# Patient Record
Sex: MALE | Race: OTHER | NOT HISPANIC OR LATINO | Employment: UNEMPLOYED | URBAN - METROPOLITAN AREA
[De-identification: names, ages, dates, MRNs, and addresses within clinical notes are randomized per-mention and may not be internally consistent; named-entity substitution may affect disease eponyms.]

---

## 2017-01-01 ENCOUNTER — APPOINTMENT (OUTPATIENT)
Dept: RADIOLOGY | Facility: MEDICAL CENTER | Age: 67
DRG: 435 | End: 2017-01-01
Attending: HOSPITALIST
Payer: MEDICARE

## 2017-01-01 ENCOUNTER — APPOINTMENT (OUTPATIENT)
Dept: RADIOLOGY | Facility: MEDICAL CENTER | Age: 67
DRG: 435 | End: 2017-01-01
Attending: EMERGENCY MEDICINE
Payer: MEDICARE

## 2017-01-01 ENCOUNTER — RESOLUTE PROFESSIONAL BILLING HOSPITAL PROF FEE (OUTPATIENT)
Dept: HOSPITALIST | Facility: MEDICAL CENTER | Age: 67
End: 2017-01-01
Payer: MEDICARE

## 2017-01-01 ENCOUNTER — HOSPITAL ENCOUNTER (INPATIENT)
Facility: MEDICAL CENTER | Age: 67
LOS: 4 days | DRG: 435 | End: 2017-03-06
Attending: EMERGENCY MEDICINE | Admitting: HOSPITALIST
Payer: MEDICARE

## 2017-01-01 ENCOUNTER — APPOINTMENT (OUTPATIENT)
Dept: RADIOLOGY | Facility: MEDICAL CENTER | Age: 67
DRG: 435 | End: 2017-01-01
Attending: INTERNAL MEDICINE
Payer: MEDICARE

## 2017-01-01 VITALS
SYSTOLIC BLOOD PRESSURE: 59 MMHG | TEMPERATURE: 97 F | HEART RATE: 113 BPM | DIASTOLIC BLOOD PRESSURE: 39 MMHG | BODY MASS INDEX: 23.84 KG/M2 | OXYGEN SATURATION: 93 % | HEIGHT: 76 IN | RESPIRATION RATE: 10 BRPM | WEIGHT: 195.77 LBS

## 2017-01-01 DIAGNOSIS — E86.0 DEHYDRATION: ICD-10-CM

## 2017-01-01 DIAGNOSIS — R11.10 CHRONIC VOMITING: ICD-10-CM

## 2017-01-01 DIAGNOSIS — R06.09 EXERTIONAL DYSPNEA: ICD-10-CM

## 2017-01-01 DIAGNOSIS — I26.99 OTHER PULMONARY EMBOLISM WITHOUT ACUTE COR PULMONALE, UNSPECIFIED CHRONICITY (HCC): ICD-10-CM

## 2017-01-01 DIAGNOSIS — R18.0 ASCITES, MALIGNANT: ICD-10-CM

## 2017-01-01 DIAGNOSIS — C43.9 MALIGNANT MELANOMA, UNSPECIFIED SITE (HCC): ICD-10-CM

## 2017-01-01 DIAGNOSIS — G89.29 CHRONIC RUQ PAIN: ICD-10-CM

## 2017-01-01 DIAGNOSIS — R10.11 CHRONIC RUQ PAIN: ICD-10-CM

## 2017-01-01 DIAGNOSIS — C79.9 METASTATIC CANCER (HCC): ICD-10-CM

## 2017-01-01 LAB
ABO GROUP BLD: NORMAL
ABO GROUP BLD: NORMAL
ALBUMIN SERPL BCP-MCNC: 2.4 G/DL (ref 3.2–4.9)
ALBUMIN/GLOB SERPL: 0.8 G/DL
ALP SERPL-CCNC: 548 U/L (ref 30–99)
ALT SERPL-CCNC: 48 U/L (ref 2–50)
ANION GAP SERPL CALC-SCNC: 10 MMOL/L (ref 0–11.9)
ANION GAP SERPL CALC-SCNC: 8 MMOL/L (ref 0–11.9)
ANISOCYTOSIS BLD QL SMEAR: ABNORMAL
APTT PPP: 32.9 SEC (ref 24.7–36)
AST SERPL-CCNC: 84 U/L (ref 12–45)
BASOPHILS # BLD AUTO: 0.5 % (ref 0–1.8)
BASOPHILS # BLD: 0.03 K/UL (ref 0–0.12)
BILIRUB SERPL-MCNC: 2.1 MG/DL (ref 0.1–1.5)
BLD GP AB SCN SERPL QL: NORMAL
BNP SERPL-MCNC: 185 PG/ML (ref 0–100)
BUN SERPL-MCNC: 32 MG/DL (ref 8–22)
BUN SERPL-MCNC: 35 MG/DL (ref 8–22)
CALCIUM SERPL-MCNC: 7.6 MG/DL (ref 8.5–10.5)
CALCIUM SERPL-MCNC: 8.1 MG/DL (ref 8.5–10.5)
CHLORIDE SERPL-SCNC: 104 MMOL/L (ref 96–112)
CHLORIDE SERPL-SCNC: 106 MMOL/L (ref 96–112)
CO2 SERPL-SCNC: 20 MMOL/L (ref 20–33)
CO2 SERPL-SCNC: 20 MMOL/L (ref 20–33)
COMMENT 1642: NORMAL
CREAT SERPL-MCNC: 0.83 MG/DL (ref 0.5–1.4)
CREAT SERPL-MCNC: 1.05 MG/DL (ref 0.5–1.4)
EKG IMPRESSION: NORMAL
EOSINOPHIL # BLD AUTO: 0.01 K/UL (ref 0–0.51)
EOSINOPHIL NFR BLD: 0.2 % (ref 0–6.9)
ERYTHROCYTE [DISTWIDTH] IN BLOOD BY AUTOMATED COUNT: 65 FL (ref 35.9–50)
ERYTHROCYTE [DISTWIDTH] IN BLOOD BY AUTOMATED COUNT: 66 FL (ref 35.9–50)
GFR SERPL CREATININE-BSD FRML MDRD: >60 ML/MIN/1.73 M 2
GFR SERPL CREATININE-BSD FRML MDRD: >60 ML/MIN/1.73 M 2
GLOBULIN SER CALC-MCNC: 3 G/DL (ref 1.9–3.5)
GLUCOSE SERPL-MCNC: 113 MG/DL (ref 65–99)
GLUCOSE SERPL-MCNC: 93 MG/DL (ref 65–99)
HCT VFR BLD AUTO: 38.5 % (ref 42–52)
HCT VFR BLD AUTO: 43.8 % (ref 42–52)
HGB BLD-MCNC: 12.3 G/DL (ref 14–18)
HGB BLD-MCNC: 14.1 G/DL (ref 14–18)
IMM GRANULOCYTES # BLD AUTO: 0.03 K/UL (ref 0–0.11)
IMM GRANULOCYTES NFR BLD AUTO: 0.5 % (ref 0–0.9)
INR PPP: 1.14 (ref 0.87–1.13)
LIPASE SERPL-CCNC: 32 U/L (ref 11–82)
LYMPHOCYTES # BLD AUTO: 0.48 K/UL (ref 1–4.8)
LYMPHOCYTES NFR BLD: 7.9 % (ref 22–41)
MACROCYTES BLD QL SMEAR: ABNORMAL
MCH RBC QN AUTO: 30.3 PG (ref 27–33)
MCH RBC QN AUTO: 30.8 PG (ref 27–33)
MCHC RBC AUTO-ENTMCNC: 31.9 G/DL (ref 33.7–35.3)
MCHC RBC AUTO-ENTMCNC: 32.2 G/DL (ref 33.7–35.3)
MCV RBC AUTO: 94.8 FL (ref 81.4–97.8)
MCV RBC AUTO: 95.6 FL (ref 81.4–97.8)
MONOCYTES # BLD AUTO: 0.26 K/UL (ref 0–0.85)
MONOCYTES NFR BLD AUTO: 4.3 % (ref 0–13.4)
MORPHOLOGY BLD-IMP: NORMAL
NEUTROPHILS # BLD AUTO: 5.28 K/UL (ref 1.82–7.42)
NEUTROPHILS NFR BLD: 86.6 % (ref 44–72)
NRBC # BLD AUTO: 0 K/UL
NRBC BLD AUTO-RTO: 0 /100 WBC
PLATELET # BLD AUTO: 127 K/UL (ref 164–446)
PLATELET # BLD AUTO: 142 K/UL (ref 164–446)
PLATELET BLD QL SMEAR: NORMAL
PMV BLD AUTO: 10.4 FL (ref 9–12.9)
PMV BLD AUTO: 10.8 FL (ref 9–12.9)
POLYCHROMASIA BLD QL SMEAR: NORMAL
POTASSIUM SERPL-SCNC: 4.5 MMOL/L (ref 3.6–5.5)
POTASSIUM SERPL-SCNC: 5 MMOL/L (ref 3.6–5.5)
PROT SERPL-MCNC: 5.4 G/DL (ref 6–8.2)
PROTHROMBIN TIME: 15 SEC (ref 12–14.6)
RBC # BLD AUTO: 4.06 M/UL (ref 4.7–6.1)
RBC # BLD AUTO: 4.58 M/UL (ref 4.7–6.1)
RBC BLD AUTO: PRESENT
RH BLD: NORMAL
SODIUM SERPL-SCNC: 134 MMOL/L (ref 135–145)
SODIUM SERPL-SCNC: 134 MMOL/L (ref 135–145)
TROPONIN I SERPL-MCNC: <0.01 NG/ML (ref 0–0.04)
WBC # BLD AUTO: 5.9 K/UL (ref 4.8–10.8)
WBC # BLD AUTO: 6.1 K/UL (ref 4.8–10.8)

## 2017-01-01 PROCEDURE — 83690 ASSAY OF LIPASE: CPT

## 2017-01-01 PROCEDURE — 700102 HCHG RX REV CODE 250 W/ 637 OVERRIDE(OP): Performed by: HOSPITALIST

## 2017-01-01 PROCEDURE — A9270 NON-COVERED ITEM OR SERVICE: HCPCS | Performed by: INTERNAL MEDICINE

## 2017-01-01 PROCEDURE — 700111 HCHG RX REV CODE 636 W/ 250 OVERRIDE (IP): Performed by: HOSPITALIST

## 2017-01-01 PROCEDURE — 770009 HCHG ROOM/CARE - ONCOLOGY SEMI PRI*

## 2017-01-01 PROCEDURE — 90471 IMMUNIZATION ADMIN: CPT

## 2017-01-01 PROCEDURE — 76942 ECHO GUIDE FOR BIOPSY: CPT

## 2017-01-01 PROCEDURE — 99233 SBSQ HOSP IP/OBS HIGH 50: CPT | Performed by: INTERNAL MEDICINE

## 2017-01-01 PROCEDURE — 700105 HCHG RX REV CODE 258: Performed by: HOSPITALIST

## 2017-01-01 PROCEDURE — 85610 PROTHROMBIN TIME: CPT

## 2017-01-01 PROCEDURE — A9270 NON-COVERED ITEM OR SERVICE: HCPCS | Performed by: HOSPITALIST

## 2017-01-01 PROCEDURE — 90670 PCV13 VACCINE IM: CPT | Performed by: HOSPITALIST

## 2017-01-01 PROCEDURE — 700117 HCHG RX CONTRAST REV CODE 255: Performed by: EMERGENCY MEDICINE

## 2017-01-01 PROCEDURE — 302129 PCA PLUS: Performed by: INTERNAL MEDICINE

## 2017-01-01 PROCEDURE — 700111 HCHG RX REV CODE 636 W/ 250 OVERRIDE (IP): Performed by: INTERNAL MEDICINE

## 2017-01-01 PROCEDURE — 80048 BASIC METABOLIC PNL TOTAL CA: CPT

## 2017-01-01 PROCEDURE — 84484 ASSAY OF TROPONIN QUANT: CPT

## 2017-01-01 PROCEDURE — 47000 NEEDLE BIOPSY OF LIVER PERQ: CPT

## 2017-01-01 PROCEDURE — 93005 ELECTROCARDIOGRAM TRACING: CPT | Performed by: EMERGENCY MEDICINE

## 2017-01-01 PROCEDURE — 96374 THER/PROPH/DIAG INJ IV PUSH: CPT

## 2017-01-01 PROCEDURE — 99285 EMERGENCY DEPT VISIT HI MDM: CPT

## 2017-01-01 PROCEDURE — 700111 HCHG RX REV CODE 636 W/ 250 OVERRIDE (IP): Performed by: NURSE PRACTITIONER

## 2017-01-01 PROCEDURE — 0W9G3ZZ DRAINAGE OF PERITONEAL CAVITY, PERCUTANEOUS APPROACH: ICD-10-PCS | Performed by: RADIOLOGY

## 2017-01-01 PROCEDURE — 700111 HCHG RX REV CODE 636 W/ 250 OVERRIDE (IP): Performed by: EMERGENCY MEDICINE

## 2017-01-01 PROCEDURE — 36415 COLL VENOUS BLD VENIPUNCTURE: CPT

## 2017-01-01 PROCEDURE — 96361 HYDRATE IV INFUSION ADD-ON: CPT

## 2017-01-01 PROCEDURE — 88307 TISSUE EXAM BY PATHOLOGIST: CPT

## 2017-01-01 PROCEDURE — 71260 CT THORAX DX C+: CPT

## 2017-01-01 PROCEDURE — 700102 HCHG RX REV CODE 250 W/ 637 OVERRIDE(OP): Performed by: INTERNAL MEDICINE

## 2017-01-01 PROCEDURE — 3E0234Z INTRODUCTION OF SERUM, TOXOID AND VACCINE INTO MUSCLE, PERCUTANEOUS APPROACH: ICD-10-PCS | Performed by: HOSPITALIST

## 2017-01-01 PROCEDURE — 302151 K-PAD 14X20: Performed by: INTERNAL MEDICINE

## 2017-01-01 PROCEDURE — 85730 THROMBOPLASTIN TIME PARTIAL: CPT

## 2017-01-01 PROCEDURE — 49083 ABD PARACENTESIS W/IMAGING: CPT

## 2017-01-01 PROCEDURE — 80053 COMPREHEN METABOLIC PANEL: CPT

## 2017-01-01 PROCEDURE — 85027 COMPLETE CBC AUTOMATED: CPT

## 2017-01-01 PROCEDURE — 700105 HCHG RX REV CODE 258: Performed by: EMERGENCY MEDICINE

## 2017-01-01 PROCEDURE — 83880 ASSAY OF NATRIURETIC PEPTIDE: CPT

## 2017-01-01 PROCEDURE — 0FB13ZX EXCISION OF RIGHT LOBE LIVER, PERCUTANEOUS APPROACH, DIAGNOSTIC: ICD-10-PCS | Performed by: RADIOLOGY

## 2017-01-01 PROCEDURE — 99223 1ST HOSP IP/OBS HIGH 75: CPT | Performed by: HOSPITALIST

## 2017-01-01 PROCEDURE — 700111 HCHG RX REV CODE 636 W/ 250 OVERRIDE (IP)

## 2017-01-01 PROCEDURE — 86901 BLOOD TYPING SEROLOGIC RH(D): CPT

## 2017-01-01 PROCEDURE — 85025 COMPLETE CBC W/AUTO DIFF WBC: CPT

## 2017-01-01 PROCEDURE — 86900 BLOOD TYPING SEROLOGIC ABO: CPT

## 2017-01-01 PROCEDURE — 94760 N-INVAS EAR/PLS OXIMETRY 1: CPT

## 2017-01-01 PROCEDURE — 99239 HOSP IP/OBS DSCHRG MGMT >30: CPT | Performed by: INTERNAL MEDICINE

## 2017-01-01 PROCEDURE — 99153 MOD SED SAME PHYS/QHP EA: CPT

## 2017-01-01 PROCEDURE — 302131 K PAD MOTOR: Performed by: INTERNAL MEDICINE

## 2017-01-01 PROCEDURE — 86850 RBC ANTIBODY SCREEN: CPT

## 2017-01-01 RX ORDER — SPIRONOLACTONE 25 MG/1
50 TABLET ORAL
Status: DISCONTINUED | OUTPATIENT
Start: 2017-01-01 | End: 2017-01-01 | Stop reason: HOSPADM

## 2017-01-01 RX ORDER — SCOLOPAMINE TRANSDERMAL SYSTEM 1 MG/1
1 PATCH, EXTENDED RELEASE TRANSDERMAL
Status: DISCONTINUED | OUTPATIENT
Start: 2017-01-01 | End: 2017-01-01 | Stop reason: HOSPADM

## 2017-01-01 RX ORDER — MORPHINE SULFATE 4 MG/ML
2-4 INJECTION, SOLUTION INTRAMUSCULAR; INTRAVENOUS
Status: DISCONTINUED | OUTPATIENT
Start: 2017-01-01 | End: 2017-01-01

## 2017-01-01 RX ORDER — FUROSEMIDE 10 MG/ML
40 INJECTION INTRAMUSCULAR; INTRAVENOUS ONCE
Status: COMPLETED | OUTPATIENT
Start: 2017-01-01 | End: 2017-01-01

## 2017-01-01 RX ORDER — ONDANSETRON 2 MG/ML
4 INJECTION INTRAMUSCULAR; INTRAVENOUS PRN
Status: ACTIVE | OUTPATIENT
Start: 2017-01-01 | End: 2017-01-01

## 2017-01-01 RX ORDER — BISACODYL 10 MG
10 SUPPOSITORY, RECTAL RECTAL
Status: DISCONTINUED | OUTPATIENT
Start: 2017-01-01 | End: 2017-01-01 | Stop reason: HOSPADM

## 2017-01-01 RX ORDER — LORAZEPAM 2 MG/ML
0.5 INJECTION INTRAMUSCULAR ONCE
Status: COMPLETED | OUTPATIENT
Start: 2017-01-01 | End: 2017-01-01

## 2017-01-01 RX ORDER — ACETAMINOPHEN 325 MG/1
650 TABLET ORAL EVERY 6 HOURS PRN
Status: DISCONTINUED | OUTPATIENT
Start: 2017-01-01 | End: 2017-01-01 | Stop reason: HOSPADM

## 2017-01-01 RX ORDER — HYDROMORPHONE HYDROCHLORIDE 2 MG/ML
2 INJECTION, SOLUTION INTRAMUSCULAR; INTRAVENOUS; SUBCUTANEOUS
Status: DISCONTINUED | OUTPATIENT
Start: 2017-01-01 | End: 2017-01-01 | Stop reason: ALTCHOICE

## 2017-01-01 RX ORDER — SODIUM CHLORIDE 9 MG/ML
INJECTION, SOLUTION INTRAVENOUS CONTINUOUS
Status: DISCONTINUED | OUTPATIENT
Start: 2017-01-01 | End: 2017-01-01 | Stop reason: HOSPADM

## 2017-01-01 RX ORDER — MIDAZOLAM HYDROCHLORIDE 1 MG/ML
.5-2 INJECTION INTRAMUSCULAR; INTRAVENOUS PRN
Status: ACTIVE | OUTPATIENT
Start: 2017-01-01 | End: 2017-01-01

## 2017-01-01 RX ORDER — PROMETHAZINE HYDROCHLORIDE 25 MG/1
12.5 SUPPOSITORY RECTAL EVERY 6 HOURS PRN
Status: DISCONTINUED | OUTPATIENT
Start: 2017-01-01 | End: 2017-01-01 | Stop reason: HOSPADM

## 2017-01-01 RX ORDER — SODIUM CHLORIDE 9 MG/ML
INJECTION, SOLUTION INTRAVENOUS CONTINUOUS
Status: DISCONTINUED | OUTPATIENT
Start: 2017-01-01 | End: 2017-01-01

## 2017-01-01 RX ORDER — OXYCODONE HYDROCHLORIDE 5 MG/1
5 TABLET ORAL
Status: DISCONTINUED | OUTPATIENT
Start: 2017-01-01 | End: 2017-01-01 | Stop reason: ALTCHOICE

## 2017-01-01 RX ORDER — POLYETHYLENE GLYCOL 3350 17 G/17G
1 POWDER, FOR SOLUTION ORAL
Status: DISCONTINUED | OUTPATIENT
Start: 2017-01-01 | End: 2017-01-01 | Stop reason: HOSPADM

## 2017-01-01 RX ORDER — MORPHINE SULFATE 10 MG/ML
5 INJECTION, SOLUTION INTRAMUSCULAR; INTRAVENOUS
Status: DISCONTINUED | OUTPATIENT
Start: 2017-01-01 | End: 2017-01-01

## 2017-01-01 RX ORDER — ONDANSETRON 2 MG/ML
4 INJECTION INTRAMUSCULAR; INTRAVENOUS EVERY 4 HOURS PRN
Status: DISCONTINUED | OUTPATIENT
Start: 2017-01-01 | End: 2017-01-01 | Stop reason: HOSPADM

## 2017-01-01 RX ORDER — MIDAZOLAM HYDROCHLORIDE 1 MG/ML
INJECTION INTRAMUSCULAR; INTRAVENOUS
Status: COMPLETED
Start: 2017-01-01 | End: 2017-01-01

## 2017-01-01 RX ORDER — OXYCODONE HYDROCHLORIDE 5 MG/1
2.5 TABLET ORAL
Status: DISCONTINUED | OUTPATIENT
Start: 2017-01-01 | End: 2017-01-01

## 2017-01-01 RX ORDER — OXYCODONE HYDROCHLORIDE 10 MG/1
10 TABLET ORAL
Status: DISCONTINUED | OUTPATIENT
Start: 2017-01-01 | End: 2017-01-01 | Stop reason: ALTCHOICE

## 2017-01-01 RX ORDER — AMOXICILLIN 250 MG
2 CAPSULE ORAL 2 TIMES DAILY
Status: DISCONTINUED | OUTPATIENT
Start: 2017-01-01 | End: 2017-01-01 | Stop reason: HOSPADM

## 2017-01-01 RX ORDER — SODIUM CHLORIDE 9 MG/ML
500 INJECTION, SOLUTION INTRAVENOUS PRN
Status: DISCONTINUED | OUTPATIENT
Start: 2017-01-01 | End: 2017-01-01 | Stop reason: HOSPADM

## 2017-01-01 RX ORDER — SODIUM CHLORIDE 9 MG/ML
1000 INJECTION, SOLUTION INTRAVENOUS ONCE
Status: COMPLETED | OUTPATIENT
Start: 2017-01-01 | End: 2017-01-01

## 2017-01-01 RX ORDER — MORPHINE SULFATE 4 MG/ML
2 INJECTION, SOLUTION INTRAMUSCULAR; INTRAVENOUS
Status: DISCONTINUED | OUTPATIENT
Start: 2017-01-01 | End: 2017-01-01

## 2017-01-01 RX ORDER — ONDANSETRON 4 MG/1
4 TABLET, ORALLY DISINTEGRATING ORAL EVERY 4 HOURS PRN
Status: DISCONTINUED | OUTPATIENT
Start: 2017-01-01 | End: 2017-01-01 | Stop reason: HOSPADM

## 2017-01-01 RX ORDER — PROMETHAZINE HYDROCHLORIDE 25 MG/1
25 TABLET ORAL EVERY 6 HOURS PRN
Status: DISCONTINUED | OUTPATIENT
Start: 2017-01-01 | End: 2017-01-01 | Stop reason: HOSPADM

## 2017-01-01 RX ORDER — FAMOTIDINE 20 MG/1
20 TABLET, FILM COATED ORAL 2 TIMES DAILY
Status: DISCONTINUED | OUTPATIENT
Start: 2017-01-01 | End: 2017-01-01 | Stop reason: HOSPADM

## 2017-01-01 RX ORDER — OXYCODONE HYDROCHLORIDE 5 MG/1
5 TABLET ORAL
Status: DISCONTINUED | OUTPATIENT
Start: 2017-01-01 | End: 2017-01-01

## 2017-01-01 RX ORDER — HALOPERIDOL 5 MG/ML
5 INJECTION INTRAMUSCULAR EVERY 4 HOURS PRN
Status: DISCONTINUED | OUTPATIENT
Start: 2017-01-01 | End: 2017-01-01 | Stop reason: HOSPADM

## 2017-01-01 RX ORDER — LABETALOL HYDROCHLORIDE 5 MG/ML
10 INJECTION, SOLUTION INTRAVENOUS EVERY 4 HOURS PRN
Status: DISCONTINUED | OUTPATIENT
Start: 2017-01-01 | End: 2017-01-01 | Stop reason: HOSPADM

## 2017-01-01 RX ORDER — SUCRALFATE ORAL 1 G/10ML
1 SUSPENSION ORAL EVERY 6 HOURS
Status: DISCONTINUED | OUTPATIENT
Start: 2017-01-01 | End: 2017-01-01 | Stop reason: HOSPADM

## 2017-01-01 RX ADMIN — ONDANSETRON 4 MG: 2 INJECTION, SOLUTION INTRAMUSCULAR; INTRAVENOUS at 16:35

## 2017-01-01 RX ADMIN — HYDROMORPHONE HYDROCHLORIDE 1 MG: 1 INJECTION, SOLUTION INTRAMUSCULAR; INTRAVENOUS; SUBCUTANEOUS at 06:10

## 2017-01-01 RX ADMIN — OXYCODONE HYDROCHLORIDE 10 MG: 10 TABLET ORAL at 14:09

## 2017-01-01 RX ADMIN — OXYCODONE HYDROCHLORIDE 5 MG: 5 TABLET ORAL at 00:44

## 2017-01-01 RX ADMIN — MIDAZOLAM 1 MG: 1 INJECTION INTRAMUSCULAR; INTRAVENOUS at 13:42

## 2017-01-01 RX ADMIN — MORPHINE SULFATE 2 MG: 4 INJECTION INTRAVENOUS at 15:17

## 2017-01-01 RX ADMIN — SUCRALFATE 1 G: 1 SUSPENSION ORAL at 00:05

## 2017-01-01 RX ADMIN — MORPHINE SULFATE 2 MG: 4 INJECTION INTRAVENOUS at 08:08

## 2017-01-01 RX ADMIN — LIDOCAINE HYDROCHLORIDE 30 ML: 20 SOLUTION OROPHARYNGEAL at 23:35

## 2017-01-01 RX ADMIN — HYDROMORPHONE HYDROCHLORIDE 1 MG: 1 INJECTION, SOLUTION INTRAMUSCULAR; INTRAVENOUS; SUBCUTANEOUS at 08:17

## 2017-01-01 RX ADMIN — SUCRALFATE 1 G: 1 SUSPENSION ORAL at 05:50

## 2017-01-01 RX ADMIN — STANDARDIZED SENNA CONCENTRATE AND DOCUSATE SODIUM 2 TABLET: 8.6; 5 TABLET, FILM COATED ORAL at 08:17

## 2017-01-01 RX ADMIN — FAMOTIDINE 20 MG: 10 INJECTION INTRAVENOUS at 21:05

## 2017-01-01 RX ADMIN — SUCRALFATE 1 G: 1 SUSPENSION ORAL at 12:24

## 2017-01-01 RX ADMIN — FAMOTIDINE 20 MG: 10 INJECTION INTRAVENOUS at 20:40

## 2017-01-01 RX ADMIN — LIDOCAINE HYDROCHLORIDE 30 ML: 20 SOLUTION OROPHARYNGEAL at 05:50

## 2017-01-01 RX ADMIN — OXYCODONE HYDROCHLORIDE 5 MG: 5 TABLET ORAL at 21:27

## 2017-01-01 RX ADMIN — ONDANSETRON 4 MG: 2 INJECTION, SOLUTION INTRAMUSCULAR; INTRAVENOUS at 08:17

## 2017-01-01 RX ADMIN — PROCHLORPERAZINE EDISYLATE 10 MG: 5 INJECTION INTRAMUSCULAR; INTRAVENOUS at 08:41

## 2017-01-01 RX ADMIN — SODIUM CHLORIDE 1000 ML: 9 INJECTION, SOLUTION INTRAVENOUS at 13:45

## 2017-01-01 RX ADMIN — SUCRALFATE 1 G: 1 SUSPENSION ORAL at 06:01

## 2017-01-01 RX ADMIN — FAMOTIDINE 20 MG: 10 INJECTION INTRAVENOUS at 08:31

## 2017-01-01 RX ADMIN — FENTANYL CITRATE 25 MCG: 50 INJECTION, SOLUTION INTRAMUSCULAR; INTRAVENOUS at 13:42

## 2017-01-01 RX ADMIN — SCOPALAMINE 1 PATCH: 1 PATCH, EXTENDED RELEASE TRANSDERMAL at 10:38

## 2017-01-01 RX ADMIN — PNEUMOCOCCAL 13-VALENT CONJUGATE VACCINE 0.5 ML: 2.2; 2.2; 2.2; 2.2; 2.2; 4.4; 2.2; 2.2; 2.2; 2.2; 2.2; 2.2; 2.2 INJECTION, SUSPENSION INTRAMUSCULAR at 21:21

## 2017-01-01 RX ADMIN — HYDROMORPHONE HYDROCHLORIDE 1 MG: 1 INJECTION, SOLUTION INTRAMUSCULAR; INTRAVENOUS; SUBCUTANEOUS at 20:59

## 2017-01-01 RX ADMIN — STANDARDIZED SENNA CONCENTRATE AND DOCUSATE SODIUM 2 TABLET: 8.6; 5 TABLET, FILM COATED ORAL at 20:44

## 2017-01-01 RX ADMIN — SPIRONOLACTONE 50 MG: 25 TABLET, FILM COATED ORAL at 06:24

## 2017-01-01 RX ADMIN — OXYCODONE HYDROCHLORIDE 5 MG: 5 TABLET ORAL at 06:58

## 2017-01-01 RX ADMIN — MIDAZOLAM HYDROCHLORIDE 1 MG: 1 INJECTION INTRAMUSCULAR; INTRAVENOUS at 13:42

## 2017-01-01 RX ADMIN — SPIRONOLACTONE 50 MG: 25 TABLET, FILM COATED ORAL at 12:03

## 2017-01-01 RX ADMIN — MORPHINE SULFATE 2 MG: 4 INJECTION INTRAVENOUS at 10:51

## 2017-01-01 RX ADMIN — SUCRALFATE 1 G: 1 SUSPENSION ORAL at 12:03

## 2017-01-01 RX ADMIN — MORPHINE SULFATE 2 MG: 4 INJECTION INTRAVENOUS at 20:23

## 2017-01-01 RX ADMIN — ENOXAPARIN SODIUM 80 MG: 100 INJECTION SUBCUTANEOUS at 08:31

## 2017-01-01 RX ADMIN — LORAZEPAM 0.5 MG: 2 INJECTION INTRAMUSCULAR; INTRAVENOUS at 00:26

## 2017-01-01 RX ADMIN — FAMOTIDINE 20 MG: 10 INJECTION INTRAVENOUS at 11:00

## 2017-01-01 RX ADMIN — SUCRALFATE 1 G: 1 SUSPENSION ORAL at 23:35

## 2017-01-01 RX ADMIN — LIDOCAINE HYDROCHLORIDE 30 ML: 20 SOLUTION OROPHARYNGEAL at 14:09

## 2017-01-01 RX ADMIN — ONDANSETRON 4 MG: 2 INJECTION, SOLUTION INTRAMUSCULAR; INTRAVENOUS at 03:57

## 2017-01-01 RX ADMIN — STANDARDIZED SENNA CONCENTRATE AND DOCUSATE SODIUM 2 TABLET: 8.6; 5 TABLET, FILM COATED ORAL at 20:19

## 2017-01-01 RX ADMIN — SUCRALFATE 1 G: 1 SUSPENSION ORAL at 17:26

## 2017-01-01 RX ADMIN — MORPHINE SULFATE 2 MG: 4 INJECTION INTRAVENOUS at 06:23

## 2017-01-01 RX ADMIN — ONDANSETRON 4 MG: 2 INJECTION, SOLUTION INTRAMUSCULAR; INTRAVENOUS at 04:27

## 2017-01-01 RX ADMIN — STANDARDIZED SENNA CONCENTRATE AND DOCUSATE SODIUM 2 TABLET: 8.6; 5 TABLET, FILM COATED ORAL at 21:18

## 2017-01-01 RX ADMIN — HYDROMORPHONE HYDROCHLORIDE 5 MG: 2 INJECTION INTRAMUSCULAR; INTRAVENOUS; SUBCUTANEOUS at 11:57

## 2017-01-01 RX ADMIN — MORPHINE SULFATE 4 MG: 4 INJECTION INTRAVENOUS at 12:24

## 2017-01-01 RX ADMIN — HYDROMORPHONE HYDROCHLORIDE 2 MG: 2 INJECTION INTRAMUSCULAR; INTRAVENOUS; SUBCUTANEOUS at 11:27

## 2017-01-01 RX ADMIN — OXYCODONE HYDROCHLORIDE 10 MG: 10 TABLET ORAL at 10:59

## 2017-01-01 RX ADMIN — LIDOCAINE HYDROCHLORIDE 30 ML: 20 SOLUTION OROPHARYNGEAL at 06:05

## 2017-01-01 RX ADMIN — ENOXAPARIN SODIUM 80 MG: 100 INJECTION SUBCUTANEOUS at 21:39

## 2017-01-01 RX ADMIN — FENTANYL CITRATE 50 MCG: 50 INJECTION, SOLUTION INTRAMUSCULAR; INTRAVENOUS at 14:35

## 2017-01-01 RX ADMIN — ONDANSETRON 4 MG: 2 INJECTION, SOLUTION INTRAMUSCULAR; INTRAVENOUS at 20:35

## 2017-01-01 RX ADMIN — HYDROMORPHONE HYDROCHLORIDE 0.5 MG: 1 INJECTION, SOLUTION INTRAMUSCULAR; INTRAVENOUS; SUBCUTANEOUS at 14:38

## 2017-01-01 RX ADMIN — SODIUM CHLORIDE: 9 INJECTION, SOLUTION INTRAVENOUS at 16:30

## 2017-01-01 RX ADMIN — FAMOTIDINE 20 MG: 10 INJECTION INTRAVENOUS at 08:17

## 2017-01-01 RX ADMIN — SODIUM CHLORIDE: 9 INJECTION, SOLUTION INTRAVENOUS at 10:57

## 2017-01-01 RX ADMIN — HYDROMORPHONE HYDROCHLORIDE 5 MG: 2 INJECTION INTRAMUSCULAR; INTRAVENOUS; SUBCUTANEOUS at 17:32

## 2017-01-01 RX ADMIN — SPIRONOLACTONE 50 MG: 25 TABLET, FILM COATED ORAL at 17:26

## 2017-01-01 RX ADMIN — OXYCODONE HYDROCHLORIDE 5 MG: 5 TABLET ORAL at 18:11

## 2017-01-01 RX ADMIN — LIDOCAINE HYDROCHLORIDE 30 ML: 20 SOLUTION OROPHARYNGEAL at 17:26

## 2017-01-01 RX ADMIN — ONDANSETRON 4 MG: 4 TABLET, ORALLY DISINTEGRATING ORAL at 21:49

## 2017-01-01 RX ADMIN — IOHEXOL 100 ML: 350 INJECTION, SOLUTION INTRAVENOUS at 15:41

## 2017-01-01 RX ADMIN — SODIUM CHLORIDE: 9 INJECTION, SOLUTION INTRAVENOUS at 08:31

## 2017-01-01 RX ADMIN — FUROSEMIDE 40 MG: 10 INJECTION, SOLUTION INTRAVENOUS at 10:38

## 2017-01-01 RX ADMIN — OXYCODONE HYDROCHLORIDE 5 MG: 5 TABLET ORAL at 22:32

## 2017-01-01 RX ADMIN — SODIUM CHLORIDE: 9 INJECTION, SOLUTION INTRAVENOUS at 13:45

## 2017-01-01 RX ADMIN — ONDANSETRON 4 MG: 4 TABLET, ORALLY DISINTEGRATING ORAL at 03:17

## 2017-01-01 RX ADMIN — HYDROMORPHONE HYDROCHLORIDE 0.5 MG/HR: 2 INJECTION INTRAMUSCULAR; INTRAVENOUS; SUBCUTANEOUS at 04:29

## 2017-01-01 RX ADMIN — ENOXAPARIN SODIUM 80 MG: 100 INJECTION SUBCUTANEOUS at 22:52

## 2017-01-01 ASSESSMENT — ENCOUNTER SYMPTOMS
FOCAL WEAKNESS: 0
EYE DISCHARGE: 0
WEIGHT LOSS: 1
SORE THROAT: 0
FEVER: 0
SEIZURES: 0
ORTHOPNEA: 0
WEIGHT LOSS: 1
EYE PAIN: 0
NERVOUS/ANXIOUS: 0
CONSTIPATION: 0
DEPRESSION: 0
DEPRESSION: 0
FEVER: 0
ABDOMINAL PAIN: 1
CONSTIPATION: 0
WEAKNESS: 1
VOMITING: 0
MYALGIAS: 0
COUGH: 0
ABDOMINAL PAIN: 1
PALPITATIONS: 0
EYE PAIN: 0
BLURRED VISION: 0
SHORTNESS OF BREATH: 0
FOCAL WEAKNESS: 0
FOCAL WEAKNESS: 0
SHORTNESS OF BREATH: 0
HEARTBURN: 0
CLAUDICATION: 0
DIZZINESS: 0
INSOMNIA: 0
HEARTBURN: 1
SHORTNESS OF BREATH: 0
PHOTOPHOBIA: 0
DIZZINESS: 0
SHORTNESS OF BREATH: 1
NERVOUS/ANXIOUS: 0
DIZZINESS: 0
WEAKNESS: 1
DOUBLE VISION: 0
HEARTBURN: 0
CONSTIPATION: 1
DIARRHEA: 0
FEVER: 0
BLURRED VISION: 0
WEIGHT LOSS: 1
SEIZURES: 0
HEADACHES: 0
VOMITING: 0
CONSTIPATION: 0
NERVOUS/ANXIOUS: 0
BLURRED VISION: 0
COUGH: 0
CLAUDICATION: 0
VOMITING: 0
CHILLS: 0
EYE DISCHARGE: 0
SPUTUM PRODUCTION: 0
MYALGIAS: 0
MYALGIAS: 0
EYE DISCHARGE: 0
NAUSEA: 1
HEADACHES: 0
SEIZURES: 0
HEMOPTYSIS: 0
ABDOMINAL PAIN: 1
CLAUDICATION: 0
EYE PAIN: 0
INSOMNIA: 0
WEAKNESS: 1
COUGH: 0
DEPRESSION: 0
INSOMNIA: 0
HEADACHES: 0

## 2017-01-01 ASSESSMENT — PAIN SCALES - GENERAL
PAINLEVEL_OUTOF10: 8
PAINLEVEL_OUTOF10: 6
PAINLEVEL_OUTOF10: ASSUMED PAIN PRESENT
PAINLEVEL_OUTOF10: 6
PAINLEVEL_OUTOF10: 8
PAINLEVEL_OUTOF10: 5
PAINLEVEL_OUTOF10: 7
PAINLEVEL_OUTOF10: 6
PAINLEVEL_OUTOF10: 7
PAINLEVEL_OUTOF10: ASSUMED PAIN PRESENT
PAINLEVEL_OUTOF10: 7
PAINLEVEL_OUTOF10: 3
PAINLEVEL_OUTOF10: 8
PAINLEVEL_OUTOF10: 5
PAINLEVEL_OUTOF10: 5
PAINLEVEL_OUTOF10: 0
PAINLEVEL_OUTOF10: 7
PAINLEVEL_OUTOF10: ASSUMED PAIN PRESENT
PAINLEVEL_OUTOF10: ASSUMED PAIN PRESENT
PAINLEVEL_OUTOF10: 6
PAINLEVEL_OUTOF10: 0
PAINLEVEL_OUTOF10: 8
PAINLEVEL_OUTOF10: ASSUMED PAIN PRESENT
PAINLEVEL_OUTOF10: 7
PAINLEVEL_OUTOF10: 9
PAINLEVEL_OUTOF10: 5
PAINLEVEL_OUTOF10: 5

## 2017-01-01 ASSESSMENT — LIFESTYLE VARIABLES
DO YOU DRINK ALCOHOL: NO
EVER_SMOKED: YES
ALCOHOL_USE: NO

## 2017-03-02 PROBLEM — R19.00 ABDOMINAL MASS: Status: ACTIVE | Noted: 2017-01-01

## 2017-03-02 PROBLEM — C79.9 METASTATIC CANCER (HCC): Status: ACTIVE | Noted: 2017-01-01

## 2017-03-02 NOTE — ED NOTES
Pt denies taking any OTC or prescription medications  No herbal supplements  Allergies reviewed  No ABX in last month

## 2017-03-02 NOTE — ED NOTES
Chief Complaint   Patient presents with   • Weakness     Pt BIB EMS for c/o RUQ pain/distention/abd mass/general weakness for 1 mo. Pt reports abd distention causing SOB. Pt BG 95 PTA. Reports no meds/ no allergies/ hx of melanoma.   • Peripheral Edema   • RUQ Pain

## 2017-03-02 NOTE — ED PROVIDER NOTES
ED Provider Note    Scribed for Lucretia Hartmann M.D. by Erlinda Ugalde. 3/2/2017, 12:58 PM.    Primary care provider: Pcp Pt States None  Means of arrival: EMS  History obtained from: patient   History limited by:  none    CHIEF COMPLAINT  Chief Complaint   Patient presents with   • Weakness     Pt BIB EMS for c/o RUQ pain/distention/abd mass/general weakness for 1 mo. Pt reports abd distention causing SOB. Pt BG 95 PTA. Reports no meds/ no allergies/ hx of melanoma.   • Peripheral Edema   • RUQ Pain     HPI  Chandrakant Jarquin is a 66 y.o. male who presents to the Emergency Department by EMS for right upper quadrant abdominal pain onset one month ago.  He states that the pain wraps around his entire abdomen and radiates around to his back. The patient states that walking causes the pain to worsen and the pain causes shortness of breath as well. He notes that his abdomen feels distended and his appetite has decreased. The patient reports feeling general weakness which worsens with exertion. The patient states that over the last month the caliber of his stool has decreased and he notices that his urine is tinged red as well.  The patient notes that his feet have been swollen for approximately one month as well. He denies fevers or chills. 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 He had daily nausea and vomiting about 3 or 4 months ago, but that has subsided recently.    REVIEW OF SYSTEMS  Pertinent positives include weakness, abdominal pain, distention, hematuria, leg swelling. Pertinent negatives include no fever or chills. As above, all other systems reviewed and are negative.   See HPI for further details. C    PAST MEDICAL HISTORY  Past Medical History   Diagnosis Date   • Cancer      melenoma right eye   • Hypertension        SURGICAL HISTORY  Past Surgical History  "  Procedure Laterality Date   • Eye enucleation  5/2/2013     Performed by Bj Anguiano M.D. at SURGERY SAME DAY [facility]     SOCIAL HISTORY  Social History   Substance Use Topics   • Smoking status: Former Smoker -- 14 years     Types: Cigarettes     Quit date: 01/01/1975   • Smokeless tobacco: Not on file   • Alcohol Use: No      History   Drug Use No     FAMILY HISTORY  No family history noted    CURRENT MEDICATIONS  No current facility-administered medications on file prior to encounter.     No current outpatient prescriptions on file prior to encounter.     ALLERGIES  Allergies   Allergen Reactions   • Chocolate Vomiting   • Peanut Oil Vomiting     PHYSICAL EXAM  VITAL SIGNS: /78 mmHg  Pulse 108  Temp(Src) 36.6 °C (97.9 °F)  Resp 36  Ht 1.93 m (6' 4\")  Wt 83.915 kg (185 lb)  BMI 22.53 kg/m2  SpO2 97%  Vitals reviewed.    Consitutional: Well-developed. Emaciated with temporal wasting. Negative for: distress.    HENT: Normocephalic, right external ear normal, left external ear normal, mucous membranes extremely dry, poor dentition   Eyes: Status post right enucleation. Left eye conjunctivae normal, extraocular movements normal. no left eye icterus.  Neck: Range of motion normal, supple. Negative for cervical adenopathy.  Cardiovascular: Tachycardic, regular rhythm, heart sounds normal, intact distal pulses. Negative for: murmur, rub, gallop.  Pulmonary/Chest Wall: Effort normal, decreased breath sounds right base. Negative for: respiratory distress, wheezes, rales, rhonchi.   Abdominal: Soft, bowel sounds normal. Positive for abdominal distention, indurated mass to right upper quadrant extending over into the left upper quadrant and right lower quadrant, tenderness to left lower quadrant Negative for: rebound, guarding.  Musculoskeletal: Normal range of motion. Generalized atrophy and wasting. 2+ pitting edema right lower extremity to mid shin, 4+ pitting edmea left lower extremity to the " knee   Neurological: Alert and oriented x3. No focal deficits.  Skin: Warm, dry. Negative for rash. Sallow  Psych: Mood/affect normal, behavior normal, judgment questionable    DIAGNOSTIC STUDIES / PROCEDURES    LABS  Results for orders placed or performed during the hospital encounter of 03/02/17   CBC WITH DIFFERENTIAL   Result Value Ref Range    WBC 6.1 4.8 - 10.8 K/uL    RBC 4.58 (L) 4.70 - 6.10 M/uL    Hemoglobin 14.1 14.0 - 18.0 g/dL    Hematocrit 43.8 42.0 - 52.0 %    MCV 95.6 81.4 - 97.8 fL    MCH 30.8 27.0 - 33.0 pg    MCHC 32.2 (L) 33.7 - 35.3 g/dL    RDW 66.0 (H) 35.9 - 50.0 fL    Platelet Count 142 (L) 164 - 446 K/uL    MPV 10.4 9.0 - 12.9 fL    Neutrophils-Polys 86.60 (H) 44.00 - 72.00 %    Lymphocytes 7.90 (L) 22.00 - 41.00 %    Monocytes 4.30 0.00 - 13.40 %    Eosinophils 0.20 0.00 - 6.90 %    Basophils 0.50 0.00 - 1.80 %    Immature Granulocytes 0.50 0.00 - 0.90 %    Nucleated RBC 0.00 /100 WBC    Neutrophils (Absolute) 5.28 1.82 - 7.42 K/uL    Lymphs (Absolute) 0.48 (L) 1.00 - 4.80 K/uL    Monos (Absolute) 0.26 0.00 - 0.85 K/uL    Eos (Absolute) 0.01 0.00 - 0.51 K/uL    Baso (Absolute) 0.03 0.00 - 0.12 K/uL    Immature Granulocytes (abs) 0.03 0.00 - 0.11 K/uL    NRBC (Absolute) 0.00 K/uL    Anisocytosis 1+     Macrocytosis 1+    COMP METABOLIC PANEL   Result Value Ref Range    Sodium 134 (L) 135 - 145 mmol/L    Potassium 5.0 3.6 - 5.5 mmol/L    Chloride 104 96 - 112 mmol/L    Co2 20 20 - 33 mmol/L    Anion Gap 10.0 0.0 - 11.9    Glucose 113 (H) 65 - 99 mg/dL    Bun 35 (H) 8 - 22 mg/dL    Creatinine 1.05 0.50 - 1.40 mg/dL    Calcium 8.1 (L) 8.5 - 10.5 mg/dL    AST(SGOT) 84 (H) 12 - 45 U/L    ALT(SGPT) 48 2 - 50 U/L    Alkaline Phosphatase 548 (H) 30 - 99 U/L    Total Bilirubin 2.1 (H) 0.1 - 1.5 mg/dL    Albumin 2.4 (L) 3.2 - 4.9 g/dL    Total Protein 5.4 (L) 6.0 - 8.2 g/dL    Globulin 3.0 1.9 - 3.5 g/dL    A-G Ratio 0.8 g/dL   LIPASE   Result Value Ref Range    Lipase 32 11 - 82 U/L   TROPONIN    Result Value Ref Range    Troponin I <0.01 0.00 - 0.04 ng/mL   BTYPE NATRIURETIC PEPTIDE   Result Value Ref Range    B Natriuretic Peptide 185 (H) 0 - 100 pg/mL   PROTHROMBIN TIME (INR)   Result Value Ref Range    PT 15.0 (H) 12.0 - 14.6 sec    INR 1.14 (H) 0.87 - 1.13   APTT   Result Value Ref Range    APTT 32.9 24.7 - 36.0 sec   COD (ADULT)   Result Value Ref Range    ABO Grouping Only O     Rh Grouping Only NEG     Antibody Screen-Cod NEG    ESTIMATED GFR   Result Value Ref Range    GFR If African American >60 >60 mL/min/1.73 m 2    GFR If Non African American >60 >60 mL/min/1.73 m 2   PERIPHERAL SMEAR REVIEW   Result Value Ref Range    Peripheral Smear Review see below    PLATELET ESTIMATE   Result Value Ref Range    Plt Estimation Decreased    MORPHOLOGY   Result Value Ref Range    RBC Morphology Present     Polychromia 1+    DIFFERENTIAL COMMENT   Result Value Ref Range    Comments-Diff see below    ABO CONFIRMATION   Result Value Ref Range    Second ABO Typing With Cod O    EKG (NOW)   Result Value Ref Range    Report       [facility] Emergency Dept.    Test Date:  2017  Pt Name:    NATALIE KHANNA               Department: ER  MRN:        [id number]                      Room:       [facility] [place]  Gender:     M                            Technician: [id number]  :        [id number]                   Requested By:RAJANI CHAO  Order #:    [id number]                    Reading MD: RAJANI CHAO MD    Measurements  Intervals                                Axis  Rate:       105                          P:          78  SD:         148                          QRS:        39  QRSD:       90                           T:          89  QT:         332  QTc:        439    Interpretive Statements  SINUS TACHYCARDIA at 105  ATRIAL PREMATURE COMPLEX  diffuse pathologic Q waves inferior lateral leads  Good R-wave progression  T wave flattening in the inferior lateral leads  BASELINE WANDER IN LEAD(S)  V4  Compared to ECG 05/02/2013 06:10:00  Atrial premature complex(es) now present  Myocardial infarct fin ding now present  Sinus rhythm no longer present  T-wave abnormality no longer present    Electronically Signed On 3-2-2017 17:11:45 PST by RAJANI CHAO MD         All labs reviewed by me.    EKG Interpretation:  12-lead EKG by my interpretation shows: SINUS TACHYCARDIA at 105  ATRIAL PREMATURE COMPLEX  diffuse pathologic Q waves inferior lateral leads  Good R-wave progression  T wave flattening in the inferior lateral leads  BASELINE WANDER IN LEAD(S) V4  Compared to ECG 05/02/2013 06:10:00  Atrial premature complex(es) now present  Myocardial infarct finding now present  Sinus rhythm no longer present  T-wave abnormality no longer present    RADIOLOGY  CT-CHEST,ABDOMEN,PELVIS WITH   Final Result      1.  Central and segmental BILATERAL pulmonary emboli without evidence of RIGHT heart strain   2.  Small area of airspace disease in the posterior LEFT lower lobe is likely an evolving area of pulmonary infarction   3.  Tiny BILATERAL pulmonary nodules, suspect metastatic disease   4.  Multiple large hepatic masses and nodules displacing adjacent organs, likely metastatic disease   5.  Ascites   6.  New sclerotic T9 vertebral body lesion which is suspicious for metastatic disease      Findings were discussed with RAJANI CHAO on 3/2/2017 4:03 PM.      US-NEEDLE BX-LIVER    (Results Pending)     The radiologist's interpretation of all radiological studies have been reviewed by me.    COURSE & MEDICAL DECISION MAKING  Nursing notes, VS, PMSFHx reviewed in chart.    Obtained and reviewed past medical records from 2013 which showed the following from a CT with contrast:  1. Small region of arterial enhancement within the right hepatic dome without definitive associated lesion.  However, given history of malignancy, dedicated dynamic phase CT or MRI of the liver is suggested to further differentiate transient  hepatic attenuation difference, subtle flow diversion related to arterioportal shunt, or discrete lesion.  2. Several small lesions within the right thyroid lobe, nonspecific by CT, which may be further assessed with ultrasound if indicated.  3. Moderate atherosclerosis.  4. Minor incidental and chronic findings as above.          INTERPRETING LOCATION:  [address] [place] [address], [id number]         Reading Provider Reading Date     Carlos Bales M.D. May 2, 2013     12:58 PM Patient seen and examined at bedside. The patient presents with weakness, abdominal pain, and edema and the differential diagnosis includes but is not limited to ascites, neoplasm, hepatitis, cholecystitis, cholelithiasis, perforated ulcer. Ordered CT chest abdomen pelvis, troponin, BNP, INR, APTT, COD, CBC with differential, CMP, lipase, estimated GFR, EKG. Patient will be treated with NS infusion 1000mL for his symptoms.      2:26 PM Patient is complaining of increased abdominal pain. Ordered 50mcg Fentanyl IV to treat patient's symptoms.     3:22 PM Spoke with Dr. Briggs, Hospitalist, about the patient's condition. He will admit the patient.     4:50 PM I visited the patient for the 3rd time and he states that he is comfortable at this time and understands his diagnosis. He has declared himself DNR and will likely be on hospice. I did not immediately start the patient on anticoagulants as I feel his PEs are likely chronic and I am oriented about bleeding into his tumors.    DISPOSITION:  Patient will be admitted to Dr. Briggs in guarded condition.    FINAL IMPRESSION  1. Chronic RUQ pain    2. Chronic vomiting    3. Exertional dyspnea    4. Malignant melanoma, unspecified site (CMS-HCC)    5. Metastatic cancer (CMS-HCC)    6. Other pulmonary embolism without acute cor pulmonale, unspecified chronicity (CMS-HCC)    7. Ascites, malignant    8. Dehydration          I, Erlinda Ugalde ([place]), am scribing for, and in the presence of,  Lucretia Hartmann M.D..    Electronically signed by: Erlinda Ugalde (Scribe), 3/2/2017    ILucretia M.D. personally performed the services described in this documentation, as scribed by Erlinda Ugalde in my presence, and it is both accurate and complete.    The note accurately reflects work and decisions made by me.  Lucretia Hartmann  3/2/2017  5:12 PM

## 2017-03-03 PROBLEM — I26.99 PULMONARY EMBOLI (HCC): Status: ACTIVE | Noted: 2017-01-01

## 2017-03-03 PROBLEM — R18.8 ASCITES: Status: ACTIVE | Noted: 2017-01-01

## 2017-03-03 NOTE — PROGRESS NOTES
Pt arrived to unit via gurney around 1710, assisted to bed. Head to toe assessment complete. Overview given of routine, call light, fall precautions, POC, pt agreeable.     No changes from epic. Pt fatigued, alert and awake. C/o abd pain, medicated per MAR. Abdomen distended, liver palpable. PIV x2, IVF infusing. Coccyx red, blanching, mepilex applied. Urinal provided, +BM PTA per pt. POC discussed- liver Bx tomorrow, npo at midnight, pain control, safety, anticoagulation for PE, pt agreeable. Call light and belongings within reach, able to make needs known, rounding in place, will monitor.

## 2017-03-03 NOTE — DIETARY
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 Pt is currently NPO x1 for biopsy. Per H&P pt decided to come to hospital and was having abdominal distention, decreased appetite, some episodes of nausea, vomiting. Per chart review pt's wt on 5/2/13 - 90.719 kg. Wt loss noted.    Ht: 193 cm   Wt 3/2: 83.915 kg - stated wt  BMI: 22.52  Diet: NPO x 1 day  Pertinent Labs 3/2: Na 135, BUN 32  Pertinent Meds: pericolace, roxicodone (prn), morphine (prn)  Fluids: NS infusion @ 75 ml/hr  Skin: No skin breakdown noted in chart  GI: Last BM 3/2    PLAN/RECOMMEND -   1) Nutrition rep to see patient daily for meal and snack preferences, when PO diet starts.   2) Advance to PO diet when medically feasible. Encourage PO  3) Needs measured weight when feasible. Weekly weights to monitor fluid and nutrition status  4) Obtain supplement order per RD as needed.    RD following

## 2017-03-03 NOTE — PROGRESS NOTES
Hospital Medicine Progress Note, Adult, Complex               Author: Stacey Duarte Date & Time created: 3/3/2017  1:11 PM     Interval History:  Patient presented with increased abdominal pain and swelling over past few months.  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  Likely this liver mass is spread of melanoma and biopsy scheduled for today for confirmation.  Oncology, Dr Gresham, consulted as patient was followed by Dr Hernandes.  Patient would like to know his options from oncology but likely will opt to go home on hospice for comfort measures given the advancement and the fact the mass is larger than the residual hepatic tissue.  Patient states his pain is currently well managed.    Review of Systems:  Review of Systems   Constitutional: Positive for weight loss. Negative for fever.   HENT: Negative for congestion and nosebleeds.    Eyes: Negative for blurred vision, pain and discharge.   Respiratory: Negative for cough and shortness of breath.    Cardiovascular: Negative for chest pain and claudication.   Gastrointestinal: Positive for abdominal pain. Negative for heartburn, vomiting and constipation.   Genitourinary: Negative for dysuria and frequency.   Musculoskeletal: Negative for myalgias and joint pain.   Skin: Negative for itching and rash.   Neurological: Positive for weakness. Negative for dizziness, focal weakness, seizures and headaches.   Psychiatric/Behavioral: Negative for depression. The patient is not nervous/anxious and does not have insomnia.        Physical Exam:  Physical Exam   Constitutional: He is oriented to person, place, and time. He appears well-developed. No distress.   Very cachectic     HENT:   Head: Normocephalic and atraumatic.   Eyes: Conjunctivae are normal.   Neck: Neck supple. No JVD present.   Cardiovascular: Normal rate, regular rhythm, normal heart sounds and intact distal pulses.  Exam reveals no gallop.    No murmur heard.  Pulmonary/Chest: Effort normal and  breath sounds normal. No respiratory distress. He exhibits no tenderness.   Abdominal: Bowel sounds are normal. He exhibits distension and mass. There is no tenderness. There is no rebound and no guarding.   Huge palpable liver mass, rigid to touch, ascites also noted.     Musculoskeletal: He exhibits no edema or tenderness.   Neurological: He is alert and oriented to person, place, and time. No cranial nerve deficit.   Skin: Skin is warm and dry. He is not diaphoretic. No erythema.   Psychiatric: He has a normal mood and affect. His behavior is normal.   Nursing note and vitals reviewed.      Labs:        Invalid input(s): [id number]  Recent Labs      17   TROPONINI  <0.01   BNPBTYPENAT  185*     Recent Labs      17   2347   SODIUM  134*  134*   POTASSIUM  5.0  4.5   CHLORIDE  104  106   CO2  20  20   BUN  35*  32*   CREATININE  1.05  0.83   CALCIUM  8.1*  7.6*     Recent Labs      17   2347   ALTSGPT  48   --    ASTSGOT  84*   --    ALKPHOSPHAT  548*   --    TBILIRUBIN  2.1*   --    LIPASE  32   --    GLUCOSE  113*  93     Recent Labs      17   13317   2347   RBC  4.58*  4.06*   HEMOGLOBIN  14.1  12.3*   HEMATOCRIT  43.8  38.5*   PLATELETCT  142*  127*   PROTHROMBTM  15.0*   --    APTT  32.9   --    INR  1.14*   --      Recent Labs      17   2347   WBC  6.1  5.9   NEUTSPOLYS  86.60*   --    LYMPHOCYTES  7.90*   --    MONOCYTES  4.30   --    EOSINOPHILS  0.20   --    BASOPHILS  0.50   --    ASTSGOT  84*   --    ALTSGPT  48   --    ALKPHOSPHAT  548*   --    TBILIRUBIN  2.1*   --            Hemodynamics:  Temp (24hrs), Av.3 °C (97.3 °F), Min:36.1 °C (96.9 °F), Max:36.5 °C (97.7 °F)  Temperature: 36.1 °C (96.9 °F)  Pulse  Av  Min: 65  Max: 108Heart Rate (Monitored): 96  Blood Pressure : 105/72 mmHg, NIBP: 112/82 mmHg     Respiratory:    Respiration: 16, Pulse Oximetry: 96 %     Work Of Breathing / Effort:  Mild  RUL Breath Sounds: Clear;Diminished, RML Breath Sounds: Clear;Crackles, RLL Breath Sounds: Clear;Diminished, JOHNY Breath Sounds: Clear;Diminished, LLL Breath Sounds: Clear;Diminished  Fluids:    Intake/Output Summary (Last 24 hours) at 03/03/17 1311  Last data filed at 03/03/17 1100   Gross per 24 hour   Intake   1123 ml   Output    650 ml   Net    473 ml        GI/Nutrition:  Orders Placed This Encounter   Procedures   • DIET NPO     Standing Status: Standing      Number of Occurrences: 8      Standing Expiration Date:      Order Specific Question:  Restrict to:     Answer:  Sips with Medications [3]     Medical Decision Making, by Problem:  Active Hospital Problems    Diagnosis   • *Liver mass [R16.0]suspect melanoma, biopsy today, causing obstruction - elevated bilirubin, high alk phos, oncology consulted     • Ascites [R18.8]possible paracentesis after biopsy depending on symptoms     • Abdominal mass [R19.00]   • Metastatic cancer (CMS-HCC) [C80.1]suspect melanoma     • HTN (hypertension) [I10]h/o, normo to hypotensive currently     • Melanoma (CMS-HCC) [C43.9]       Labs reviewed, Radiology images reviewed and Medications reviewed  Gregory catheter: No Gregory      DVT Prophylaxis: Contraindicated - High bleeding risk (start lovenox after liver biopsy d/t bilateral PE.)

## 2017-03-03 NOTE — OR SURGEON
Immediate Post- Operative Note    PostOp Diagnosis: LIVER MASSES, EXTREME HEPATOMEGALY,  hx uveal melanoma      Procedure(s): US GUIDED LIVER BIOPSY    18G CORES X 6. BLACK PIGMENTED TISSUE IN ALL SAMPLES HIGHLY SUSPICIOUS FOR MELANOMA      Estimated Blood Loss: <1CC        Complications: NONE            3/3/2017     1:54 PM     Javier Nieves

## 2017-03-03 NOTE — ED NOTES
Report given to Lilian DAVIS for room R300-00, no acute distress noted at time of admission,vss stable, elevated HR noted, pt taken to room via gurney

## 2017-03-03 NOTE — PROGRESS NOTES
Student entered room to check on patient. Patient reported 7/10 pain on abdomen. Student nurse gave patient 2mg of morphine IV alongside clinical faculty. Student nurse also hung new NS IV bag set to 75mL/hr with faculty. Patient left in room with movie playing on TV. Patient told that student will come back and check on him.    Report to nurse.

## 2017-03-03 NOTE — PROGRESS NOTES
IR Progress Note:    Ultrasound guided needle biopsy of liver by Dr. Nieves with 6 cores taken. Pt initially borderline hypotensive and remained so throughout procedure, Heart rate and pulse oximetry remained stable throughout. Report called to RYAN Ledbetter. Pt on transport to R300.

## 2017-03-03 NOTE — H&P
OUTPATIENT ONCOLOGIST:  Zaina Hernandes MD.    No primary care physician.    CHIEF COMPLAINT:  Weakness.    HISTORY OF PRESENT ILLNESS:  This is a 66-year-old male.  He has a history of   melanoma of the eye.  He had enucleation of his eye back in .  He was seen   by consultants Dr. Bashir Lomax as well as Dr. Zaina Hernandes.  His eye was   nucleated by Dr. Bj Anguiano.  The patient states he did not receive any   radiation treatment or chemotherapy and he states his primary care provider is   his girlfriend who had had a prior stroke.  He felt that he did not want to   be ill, trying to take care of her.  Nonetheless, he has had no physician   followup.  He decided to come in the hospital this time, as he is having   abdominal distention, decreased appetite, some episodes of nausea, vomiting.    Here he has a CT scan showing significant metastatic disease.  Again, has   last checkups here in the hospital was back in .    Patient does state he has been weak.  He has been having increasing abdominal   girth with distention, leg edema.  Leg edema has developed over the last   several weeks.  He denies any fevers, although he does states some chills.  He   has had some mild headaches.  He denies any problem swallowing.  He does   state some black stools on occasion.  No problems of urination.  All other   review of systems is unremarkable per CMS/AMA criteria.    ALLERGIES:  CHOCOLATE, PEANUT OIL.    CURRENT MEDICATIONS:  None.    PAST MEDICAL HISTORY:  History of melanoma of the eye with prior enucleation   of the right eye back in  with Dr. Anguiano.    PAST SURGICAL HISTORY:  He had a history of tonsillectomy as a child.    SOCIAL HISTORY:  He is single and takes care of his girlfriend who has had a   stroke.  He has children, but he states they have lost contact with him or he   does not stay in contact with them.  He states no siblings to stay in contact   with either.  Parents are .  He has  history of smoking.  No   significant alcohol use.  Quit smoking in 1975.    PHYSICAL EXAMINATION:  VITAL SIGNS:  Temperature is 97, heart rate of 16, blood pressure 119/81,   oxygen 97%.  GENERAL:  This is a cachectic appearing and gravely ill looking male.  He   appears to be mildly disheveled.  HEENT:  NCAT, he has got muscle wasting of temporal regions.  He has a fake   right eye.  Left extraocular motors are intact.  Pupil is equal and reactive.    Nares are patent.  He has dry mucosa.  Poor dentition.  NECK:  Trachea is midline.  I do not appreciate any stridor or bruits.  No   significant adenopathy.  LUNGS:  Diminished, mild crackles at the bases.  No accessory muscle use.  CARDIOVASCULAR:  He appears to be mildly tachycardic.  No murmur audible.  ABDOMEN:  He is distended, appears to have a mass palpable, pretty firm mass,   which was nontender in the right upper quadrant/epigastric region, is larger   than my hand.  Skin has not changed in coloration secondary to this.  He does   appear to have some fluid _____ as well and have distant bowel sounds.  EXTREMITIES:  He has 2+ pitting edema bilateral lower extremities.  He does   have 1+ dorsalis pedal pulses, 2+ radial artery pulses.  He does have some   clubbing of the digits.  No cyanosis.  He has no mottling of the skin.  NEUROLOGIC:  Cranial nerves II-XII grossly intact.  PSYCHIATRIC:  Normal affect.    LABORATORY DATA:  CBC shows a white blood cell count of 6.1, hemoglobin 14.1,   hematocrit of 43.8, platelet count is 142.  His chemistry panel shows a sodium   of 134, potassium 5, chloride of 104, CO2 of 20, BUN is 35, creatinine 1.05,   glucose is 113, calcium 8.1, AST of 84, ALT of 48, alkaline phosphatase is   548, total bilirubin is 2.1, albumin of 2.4.  Lipase is 32, troponin of less   than 0.01.  BNP is 185.  INR is 1.14.    IMAGING STUDIES:  CT of the chest, abdomen and pelvis show central and   segmental bilateral pulmonary emboli without  evidence of right heart strain.    Small area of airspace disease in the posterior left lower lobe likely   evolving area of pulmonary infarction.  Tiny bilateral pulmonary nodules,   suspect might have metastatic disease.  Multiple large hepatic masses and   nodules displacing the adjacent organs likely metastatic disease.  Ascites.    New sclerotic T9 vertebral body lesions suspicious for metastatic disease.    ASSESSMENT AND PLAN:  1.  Metastatic disease, probable melanoma, although biopsy has been ordered of   the liver mass.  Patient does have bilateral pulmonary emboli.  We will hold   on any anticoagulation until further biopsy has been able to be obtained.  I   have already discussed potential treatment with hospice versus chemotherapy   versus radiation treatment Nonetheless, patient has looking into the option of   going home with hospice so he can be with his girlfriend.  Patient was   entertaining the idea of how much longer chemotherapy or palliative radiation   would give him.  We will try and get an oncology consultation for this.  We   will have consideration of obtaining an MRI of the brain if the patient wishes   for further treatment versus going home with hospice.  We will have pain   controlled.  Patient may need a radiation oncology for further palliative care   if he decides for the treatment as well.  At this point in time with his   advanced disease findings probably best bet would be home with hospice.  I put   him for hospice consult.  2.  Ascites.  We will have consideration of palliative paracentesis.  We will   have pain management.  3.  Hyponatremia, we will give him gentle fluid hydration.  4.  Elevated liver enzymes, likely secondary to liver mass.  5.  Severe protein calorie malnutrition, albumin of 2.4.  6.  Coagulopathy secondary to liver involvement.    The patient is quite ill, is do not resuscitate per his wishes.       ____________________________________     CLEMENTE MENON,  DO CARRERA / CARLA    DD:  03/03/2017 00:10:27  DT:  03/03/2017 01:43:55    D#:  [id number]  Job#:  [id number]

## 2017-03-03 NOTE — CARE PLAN
Problem: Nutritional:  Goal: Achieve adequate nutritional intake  Patient will start PO diet and will consume >50% of meals  Outcome: NOT MET

## 2017-03-03 NOTE — CARE PLAN
Problem: Safety  Goal: Will remain free from falls  Intervention: Implement fall precautions  Fall precautions in place, including bed alarm, bed in lowest position, rails closest to bathroom down and call lights in place, to reduce the risk for patient falls.      Problem: Pain Management  Goal: Pain level will decrease to patient’s comfort goal  Intervention: Follow pain managment plan developed in collaboration with patient and Interdisciplinary Team  Managing pain by administering medication to patient per MAR to reduce pain and maintain patient comfort level

## 2017-03-03 NOTE — PROGRESS NOTES
Received bedside report on patient from night nurse. Patient was lying in bed comfortably and awake when entered room. Patient left in room with bed down in lowest position and told to call if needed anything.
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
## 2017-03-04 NOTE — PROGRESS NOTES
Hospital Medicine Progress Note, Adult, Complex               Author: Stacey Duarte Date & Time created: 3/4/2017  3:57 PM     Interval History:  3/3 Patient presented with increased abdominal pain and swelling over past few months.  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  Likely this liver mass is spread of melanoma and biopsy scheduled for today for confirmation.  Oncology, Dr Gresham, consulted as patient was followed by Dr Hernandes.  Patient would like to know his options from oncology but likely will opt to go home on hospice for comfort measures given the advancement and the fact the mass is larger than the residual hepatic tissue.  Patient states his pain is currently well managed.  3/4 Patient feeling immense pain this am, narcotics increased in both dose and frequency with minimal help this am, reassessed again this pm and still with significant pain - dilaudid injection orderd for relief, I truly do not believe paracentesis will help pain much as he is complaining of deep RUQ pain and not pressure like sensation.  Pathology pending but gross description from Dr Nieves supports melanoma.  Hospice order placed, SW assistance pending.    Review of Systems:  Review of Systems   Constitutional: Positive for weight loss. Negative for fever.   HENT: Negative for congestion and nosebleeds.    Eyes: Negative for blurred vision, pain and discharge.   Respiratory: Negative for cough and shortness of breath.    Cardiovascular: Negative for chest pain and claudication.   Gastrointestinal: Positive for abdominal pain. Negative for heartburn, vomiting and constipation.   Genitourinary: Negative for dysuria and frequency.   Musculoskeletal: Negative for myalgias and joint pain.   Skin: Negative for itching and rash.   Neurological: Positive for weakness. Negative for dizziness, focal weakness, seizures and headaches.   Psychiatric/Behavioral: Negative for depression. The patient is not nervous/anxious and does  not have insomnia.        Physical Exam:  Physical Exam   Constitutional: He is oriented to person, place, and time. He appears well-developed. No distress.   Very cachectic     HENT:   Head: Normocephalic and atraumatic.   Eyes: Conjunctivae are normal.   Neck: Neck supple. No JVD present.   Cardiovascular: Normal rate, regular rhythm, normal heart sounds and intact distal pulses.  Exam reveals no gallop.    No murmur heard.  Pulmonary/Chest: Effort normal and breath sounds normal. No respiratory distress. He exhibits no tenderness.   Abdominal: Bowel sounds are normal. He exhibits distension and mass. There is no tenderness. There is no rebound and no guarding.   Huge palpable liver mass, rigid to touch, ascites also noted.     Musculoskeletal: He exhibits no edema or tenderness.   Neurological: He is alert and oriented to person, place, and time. No cranial nerve deficit.   Skin: Skin is warm and dry. He is not diaphoretic. No erythema.   Psychiatric: He has a normal mood and affect. His behavior is normal.   Nursing note and vitals reviewed.      Labs:        Invalid input(s): [id number]  Recent Labs      03/02/17 1335   TROPONINI  <0.01   BNPBTYPENAT  185*     Recent Labs      03/02/17   1335  03/02/17   2347   SODIUM  134*  134*   POTASSIUM  5.0  4.5   CHLORIDE  104  106   CO2  20  20   BUN  35*  32*   CREATININE  1.05  0.83   CALCIUM  8.1*  7.6*     Recent Labs      03/02/17   1335  03/02/17   2347   ALTSGPT  48   --    ASTSGOT  84*   --    ALKPHOSPHAT  548*   --    TBILIRUBIN  2.1*   --    LIPASE  32   --    GLUCOSE  113*  93     Recent Labs      03/02/17   1335  03/02/17   2347   RBC  4.58*  4.06*   HEMOGLOBIN  14.1  12.3*   HEMATOCRIT  43.8  38.5*   PLATELETCT  142*  127*   PROTHROMBTM  15.0*   --    APTT  32.9   --    INR  1.14*   --      Recent Labs      03/02/17   1335  03/02/17   2347   WBC  6.1  5.9   NEUTSPOLYS  86.60*   --    LYMPHOCYTES  7.90*   --    MONOCYTES  4.30   --    EOSINOPHILS  0.20    --    BASOPHILS  0.50   --    ASTSGOT  84*   --    ALTSGPT  48   --    ALKPHOSPHAT  548*   --    TBILIRUBIN  2.1*   --            Hemodynamics:  Temp (24hrs), Av.6 °C (97.9 °F), Min:36.4 °C (97.6 °F), Max:36.7 °C (98.1 °F)  Temperature: 36.7 °C (98 °F)  Pulse  Av  Min: 65  Max: 108   Blood Pressure : 110/85 mmHg     Respiratory:    Respiration: 17, Pulse Oximetry: 94 %     Work Of Breathing / Effort: Mild  RUL Breath Sounds: Clear;Diminished, RML Breath Sounds: Clear;Diminished, RLL Breath Sounds: Clear;Diminished, JOHNY Breath Sounds: Clear;Diminished, LLL Breath Sounds: Clear;Diminished  Fluids:    Intake/Output Summary (Last 24 hours) at 17 1557  Last data filed at 17 1238   Gross per 24 hour   Intake   1589 ml   Output    450 ml   Net   1139 ml        GI/Nutrition:  Orders Placed This Encounter   Procedures   • DIET ORDER     Standing Status: Standing      Number of Occurrences: 1      Standing Expiration Date:      Order Specific Question:  Diet:     Answer:  Regular [1]     Medical Decision Making, by Problem:  Active Hospital Problems    Diagnosis   • *Liver mass [R16.0]suspect melanoma, biopsy today, causing obstruction - elevated bilirubin, high alk phos, oncology consulted     • Ascites [R18.8]possible paracentesis after biopsy depending on symptoms     • Abdominal mass [R19.00]   • Metastatic cancer (CMS-HCC) [C80.1]suspect melanoma     • HTN (hypertension) [I10]h/o, normo to hypotensive currently     • Melanoma (CMS-HCC) [C43.9]       Labs reviewed, Radiology images reviewed and Medications reviewed  Gregory catheter: No Gregory      DVT Prophylaxis: Contraindicated - High bleeding risk (start lovenox after liver biopsy d/t bilateral PE.)

## 2017-03-04 NOTE — PROGRESS NOTES
Assumed care of pt at 0645. Assessment completed. Pt awake, alert and oriented x4 . Pt is a one person assist. C/o severe 7/10 abdominal RUQ pain, medicated per MAR, MD updated on lack of pain control- rc'd orderds for new dosages. Understands plan of care. All needs met, bed in lowest locked position, call light within reach. Hourly rounding in place.

## 2017-03-04 NOTE — CONSULTS
DATE OF SERVICE:  03/03/2017    REASON FOR CONSULT:  New diagnosis of metastatic cancer.    HISTORY OF PRESENT ILLNESS:  The patient is a very nice 66-year-old man   without much medical history who was diagnosed with a melanoma on the right   eye, treated with enucleation back in 2013.  It sounds like he was seen by Dr. Bashir Lomax after treatment as well as eventually Dr. Zaina Hernandes for   evaluation.  I am not sure what adjuvant therapy was recommended, but he   declined doing any other treatment.  He was supposed to have routine followup,   but also stopped following up with his doctors.    The patient came to the emergency room on 03/02/2017 complaining of severe   abdominal pain as well as distension, loss of appetite, weight loss as well as   some nausea.  He underwent a CT scan of the chest, abdomen and pelvis that   showed bilateral pulmonary emboli with numerous tiny pulmonary nodules in all   lobes of the lung.  His liver showed numerous masses throughout the liver.    The largest was 21.9x13.2 cm.  There was also an indistinct sclerotic area at   the T9 vertebral body.  The findings were concerning for metastatic disease.    He was admitted to the hospital and given medications for pain control.  He   was started on Lovenox for anticoagulation, but is currently being held for a   planned liver biopsy procedure.  I have been asked to consult on the case.    PAST MEDICAL HISTORY:  Melanoma of the right eye.    PAST SURGICAL HISTORY:  1.  Tonsils and adenoidectomy as a kid.  2.  Enucleation of the right eye in 2013.    ALLERGIES:  CHOCOLATE AND PEANUT OIL.    HOME MEDICATIONS:  None.    FAMILY HISTORY:  He said he has some history of cancer on his mother's side,   although he does know all the details.    SOCIAL HISTORY:  He was born in [place].  He has lived in [place] for the past   several years.  He lives with his girlfriend, and he helps to provide care for   her because she has a history of a stroke.  He  does have children, but he has   no contact with them.  He used to be a smoker, but quit in 1975.  He does not   drink any alcohol.    REVIEW OF SYSTEMS:  Review of systems is positive for illness listed above.    These include weight loss over the last 6 months as well as occasional nausea   and dysphagia.  He has had pain in the right upper quadrant of the abdomen   extending into the right chest and the right back.  He has had shortness of   breath, but no cough.  He has had some edema in his legs.  Otherwise, a   complete review of systems per the AMA and CMS criteria was negative.    PHYSICAL EXAMINATION:  VITAL SIGNS:  His height is 6 feet 4 inches, his weight is 83.9 kilograms, his   T-max is 97.9, pulse of 84, blood pressure of 107/69, respirations 16,   satting 98% on room air.  GENERAL:  No acute distress, pleasant gentleman, very cachectic, weak and   fatigued.  HEENT:  He has a prosthetic right eye.  Otherwise, sclerae are anicteric,   conjunctivae clear.  Oropharynx is clear without any erythema, exudate or   discharge.  He has very dry mucous membranes.  NECK:  Supple, nontender, no elevated JVP, no carotid bruits, no thyromegaly.  CHEST:  Clear to auscultation and percussion bilaterally.  No wheeze, rales,   or rhonchi.  CARDIOVASCULAR:  Regular rate and rhythm.  No murmurs, gallops or rubs.    Normal S1 and S2.  ABDOMEN:  He has a massively enlarged liver extending down to the umbilicus,   which is somewhat tender.  He has no guarding or rebound.  He has normoactive   bowel sounds.  LYMPH NODES:  No cervical, supraclavicular, infraclavicular, axillary or   inguinal lymphadenopathy.  SKIN:  He has a right frontal scalp lesion just out of the hairline, which is   somewhat black or bluish in coloration and raised.  Otherwise, no other   suspicious lesions, nonhealing wounds or ulcerations.  EXTREMITIES:  He has bilateral lower extremity edema with the left being   greater than the right.  2+ in the left  with trace to 1+ in the right leg.    Full range of motion.  NEUROLOGIC:  His cranial nerves II-XII are intact.  He has intact sensation to   light touch throughout.  He moves all 4 extremities symmetrically, but he is   globally very weak.  Per his report, he cannot stand on his own.  He needs   help to get around and can only walk to the bathroom and back before he is   wiped out.    LABORATORY DATA:  White blood count 5.9, hemoglobin 12.3, hematocrit 38.5%,   platelets 127 with a normal differential.  Sodium 134, potassium 4.5, chloride   106, CO2 of 20, BUN 32, creatinine 0.83, glucose 93, calcium 7.6, AST 84, ALT   48, alkaline phosphatase 548, bilirubin 2.1, albumin 2.4, protein 5.4.  PT   15, INR 1.14, PTT is 32.9.    ASSESSMENT:  The patient is a very nice 66-year-old man without much medical   history who was diagnosed with a right eye melanoma back in 2013.  I do not   know the extent of the disease or the staging.  He was seen by Dr. Hernandes.  It   is not clear what adjuvant therapy was recommended.  He elected for no   further treatment and was lost to follow up.  He presents now with massive   liver metastatic lesions as well as multiple tiny pulmonary nodules, all   consistent with metastatic disease.  He is set up for a liver biopsy later   today.  The suspicion is this is recurrent melanoma.    At this point, I think recurrent melanoma is very likely.  He will be getting   a CT-guided biopsy and we can see what that shows.  He also has a lesion on   the scalp that looks somewhat suspicious for melanoma.  We did talk about   metastatic melanoma.  The patient is very realistic in his goals.  He feels he   would not be able to tolerate much in the way of treatment and wonders if   hospice would be the best approach.  We did talk about some of the   immunotherapies as treatments for these types of cancers.  We can see response   rates about 30-40% with the immunotherapies with increase in median overall    survival by about 6 months on average.  The data is best for skin melanomas,   and there is not a great data for ocular melanomas.  We know that ocular   melanomas also tend to be more aggressive and less responsive to therapies in   general.    I do worry about how well he would tolerate any therapy, even immunotherapy.    Many people on immunotherapy have minimal side effects, but there are some   that can have severe immune related side effects.  He has an ECOG of about 4.    He is extremely weak and fatigued.  I do worry that he would have severe side   effects on any type of treatment.    At this point, the patient is willing to get the biopsy and see what it shows.    He tells me he is leaning more towards doing a hospice approach rather than   doing any treatment.  I think given his performance status and his disease   burden, this is likely a very reasonable choice.  We will continue to follow   along the case.    Thank you very much for referral of this very nice gentleman.       ____________________________________     MD FLIP RODRIGUEZ / CARLA    DD:  03/03/2017 10:41:03  DT:  03/03/2017 16:35:41    D#:  [id number]  Job#:  [id number]

## 2017-03-05 NOTE — CARE PLAN
Problem: Pain Management  Goal: Pain level will decrease to patient’s comfort goal  Intervention: Follow pain managment plan developed in collaboration with patient and Interdisciplinary Team  The patient has been medicated for pain with good results. See MAR for further details. The patient has been educated regarding the use of the 0 to 10 pain reporting scale and verbalizes understanding.       Problem: Psychosocial Needs:  Goal: Level of anxiety will decrease  The patient has been educated regarding the plan of care. The patient has been educated regarding the use of the call light and has been instructed to notify the RN if he is experiencing any discomfort or anxiety. The patient notified the RN regarding sources of anxiety. All questions about the POC for the shift have been answered at this time. The patient stated that he felt comfortable. Call light is within reach and hourly rounding is in place.

## 2017-03-05 NOTE — PROGRESS NOTES
"Hospital Medicine Progress Note, Adult, Complex               Author: Stacey Duarte Date & Time created: 3/5/2017  3:16 PM     Interval History:  3/3 Patient presented with increased abdominal pain and swelling over past few months.  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  Likely this liver mass is spread of melanoma and biopsy scheduled for today for confirmation.  Oncology, Dr Gresham, consulted as patient was followed by Dr Hernandes.  Patient would like to know his options from oncology but likely will opt to go home on hospice for comfort measures given the advancement and the fact the mass is larger than the residual hepatic tissue.  Patient states his pain is currently well managed.  3/4 Patient feeling immense pain this am, narcotics increased in both dose and frequency with minimal help this am, reassessed again this pm and still with significant pain - dilaudid injection orderd for relief, I truly do not believe paracentesis will help pain much as he is complaining of deep RUQ pain and not pressure like sensation.  Pathology pending but gross description from Dr Nieves supports melanoma.  Hospice order placed, SW assistance pending.  3/5 Patient with increasing pressure sensation, paracentesis ordered and approx 2.5L removed.  PCA also started for pain control to help maximize comfort as patient wanting to move forward with hospice.  After paracentesis, will start bid lovenox d/t bilateral PE.  Patient also requesting more liquids and low acid foods d/t GERD sensations.  Aldactone started to try and minimize the re-accumulation of ascites. Patient would like to get home for hospice and requested \"some foods that could make him stronger\" - will start boost supplements to increase protein intake.      Review of Systems:  Review of Systems   Constitutional: Positive for weight loss. Negative for fever.   HENT: Negative for congestion and nosebleeds.    Eyes: Negative for blurred vision, pain and " discharge.   Respiratory: Negative for cough and shortness of breath.    Cardiovascular: Negative for chest pain and claudication.   Gastrointestinal: Positive for heartburn and abdominal pain. Negative for vomiting and constipation.   Genitourinary: Negative for dysuria and frequency.   Musculoskeletal: Negative for myalgias and joint pain.   Skin: Negative for itching and rash.   Neurological: Positive for weakness. Negative for dizziness, focal weakness, seizures and headaches.   Psychiatric/Behavioral: Negative for depression. The patient is not nervous/anxious and does not have insomnia.        Physical Exam:  Physical Exam   Constitutional: He is oriented to person, place, and time. He appears well-developed. No distress.   Very cachectic     HENT:   Head: Normocephalic and atraumatic.   Eyes: Conjunctivae are normal.   Neck: Neck supple. No JVD present.   Cardiovascular: Normal rate, regular rhythm, normal heart sounds and intact distal pulses.  Exam reveals no gallop.    No murmur heard.  Pulmonary/Chest: Effort normal and breath sounds normal. No respiratory distress. He exhibits no tenderness.   Abdominal: Bowel sounds are normal. He exhibits distension and mass. There is tenderness. There is no rebound and no guarding.   Huge palpable liver mass, rigid to touch, ascites also noted.     Musculoskeletal: He exhibits no edema or tenderness.   Neurological: He is alert and oriented to person, place, and time. No cranial nerve deficit.   Skin: Skin is warm and dry. He is not diaphoretic. No erythema.   Psychiatric: He has a normal mood and affect. His behavior is normal.   Nursing note and vitals reviewed.      Labs:        Invalid input(s): [id number]      Recent Labs      03/02/17   2347   SODIUM  134*   POTASSIUM  4.5   CHLORIDE  106   CO2  20   BUN  32*   CREATININE  0.83   CALCIUM  7.6*     Recent Labs      03/02/17   2347   GLUCOSE  93     Recent Labs      03/02/17   2347   RBC  4.06*   HEMOGLOBIN   12.3*   HEMATOCRIT  38.5*   PLATELETCT  127*     Recent Labs      17   2347   WBC  5.9           Hemodynamics:  Temp (24hrs), Av.3 °C (97.4 °F), Min:36.1 °C (97 °F), Max:36.5 °C (97.7 °F)  Temperature: 36.1 °C (97 °F)  Pulse  Av.5  Min: 65  Max: 108   Blood Pressure : 100/68 mmHg     Respiratory:    Respiration: 18, Pulse Oximetry: 94 %     Work Of Breathing / Effort: Mild  RUL Breath Sounds: Diminished, RML Breath Sounds: Diminished, RLL Breath Sounds: Diminished, JOHNY Breath Sounds: Diminished, LLL Breath Sounds: Diminished  Fluids:    Intake/Output Summary (Last 24 hours) at 17 1516  Last data filed at 17 1330   Gross per 24 hour   Intake      0 ml   Output   2670 ml   Net  -2670 ml     Weight: 88.8 kg (195 lb 12.3 oz)  GI/Nutrition:  Orders Placed This Encounter   Procedures   • DIET ORDER     Standing Status: Standing      Number of Occurrences: 1      Standing Expiration Date:      Order Specific Question:  Diet:     Answer:  Low Fiber(GI Soft) [2]      Comments:  low acidic foods     Medical Decision Making, by Problem:  Active Hospital Problems    Diagnosis   • *Liver mass [R16.0]suspect melanoma, biopsy today, causing obstruction - elevated bilirubin, high alk phos, oncology consulted - no intervention accepted/appropriate     • Ascites [R18.8]paracentesis done today - 2450cc removed 3/5 - therapeutic only, no diagnostics run     • Abdominal mass [R19.00]pathology pending     • Metastatic cancer (CMS-HCC) [C80.1]suspect melanoma     • HTN (hypertension) [I10]h/o, normo to hypotensive currently     • Melanoma (CMS-HCC) [C43.9]       Labs reviewed, Radiology images reviewed and Medications reviewed  Gregory catheter: No Gregory      DVT Prophylaxis: Contraindicated - High bleeding risk (start lovenox after liver biopsy d/t bilateral PE.)

## 2017-03-05 NOTE — PROGRESS NOTES
Assumed care of pt at 0645. Assessment completed. Pt awake, alert and oriented x4 . Pt is a one person assist. Pt c/o severe pain in abdomen unresolved with dilaudid doses this AM, as well as severe reflux and nausea symptoms. Medicated per MAR. Physician updated, order for PCA received, awaiting dose from pharmacy to start. Pt to go down for paracentesis at 1300. Patient aware and amiable to plan. Pt stated the numbers he has for his sons no longer work, and he would like to speak with them- spoke with SW regarding this. Understands plan of care. All needs met, bed in lowest locked position, call light within reach. Hourly rounding in place.

## 2017-03-05 NOTE — PROGRESS NOTES
Oncology/Hematology Progress Note               Author: Coleman Gresham    Cc:  Occular melanoma Date & Time created: 3/5/2017  10:17 AM     Interval History:  Admitted with diffuse liver metastasis.  Got liver mass biopsy on 3/3/17.  Awaiting path.  Still with a lot of pain.  Trouble with nausea, hiccups, and heartburn.  Does have constipation.      PMHx, PSurgHx, SocHx, FAMHx:  All reviewed and unchanged    Review of Systems:  Review of Systems   Constitutional: Positive for weight loss and malaise/fatigue. Negative for fever and chills.   HENT: Negative for nosebleeds and sore throat.    Eyes: Negative for blurred vision, double vision, photophobia and pain.   Respiratory: Positive for shortness of breath. Negative for cough, hemoptysis and sputum production.    Cardiovascular: Negative for chest pain, palpitations, orthopnea and leg swelling.   Gastrointestinal: Positive for heartburn, nausea, abdominal pain and constipation. Negative for vomiting and diarrhea.   Genitourinary: Negative for dysuria, urgency and frequency.   Skin: Negative for itching and rash.   Neurological: Positive for weakness. Negative for headaches.       Physical Exam:  Physical Exam   Constitutional: He is oriented to person, place, and time. No distress.   ECOG=4, weak, cachectic   HENT:   Head: Normocephalic and atraumatic.   Mouth/Throat: Oropharynx is clear and moist. No oropharyngeal exudate.   MM's are dry   Eyes: Conjunctivae are normal. Right eye exhibits no discharge. Left eye exhibits no discharge. No scleral icterus.   Neck: Normal range of motion. Neck supple. No thyromegaly present.   Cardiovascular: Normal rate and regular rhythm.  Exam reveals no gallop and no friction rub.    No murmur heard.  Pulmonary/Chest: Effort normal and breath sounds normal. No respiratory distress. He has no wheezes. He has no rales.   Decreased BS at bases   Abdominal: Soft. Bowel sounds are normal. He exhibits distension. There is tenderness.  There is no rebound and no guarding.   Massive liver down to umbilicus   Musculoskeletal: Normal range of motion. He exhibits edema. He exhibits no tenderness.   LLE edema 3+, RLE edema 2+   Lymphadenopathy:     He has no cervical adenopathy.   Neurological: He is alert and oriented to person, place, and time.   Skin: Skin is warm and dry. No rash noted. He is not diaphoretic.   Psychiatric: He has a normal mood and affect. His behavior is normal. Thought content normal.       Labs:        Invalid input(s): [id number]  Recent Labs      17   TROPONINI  <0.01   BNPBTYPENAT  185*     Recent Labs      17   SODIUM  134*  134*   POTASSIUM  5.0  4.5   CHLORIDE  104  106   CO2  20  20   BUN  35*  32*   CREATININE  1.05  0.83   CALCIUM  8.1*  7.6*     Recent Labs      17   2347   ALTSGPT  48   --    ASTSGOT  84*   --    ALKPHOSPHAT  548*   --    TBILIRUBIN  2.1*   --    LIPASE  32   --    GLUCOSE  113*  93     Recent Labs      17   2347   RBC  4.58*  4.06*   HEMOGLOBIN  14.1  12.3*   HEMATOCRIT  43.8  38.5*   PLATELETCT  142*  127*   PROTHROMBTM  15.0*   --    APTT  32.9   --    INR  1.14*   --      Recent Labs      17   2347   WBC  6.1  5.9   NEUTSPOLYS  86.60*   --    LYMPHOCYTES  7.90*   --    MONOCYTES  4.30   --    EOSINOPHILS  0.20   --    BASOPHILS  0.50   --    ASTSGOT  84*   --    ALTSGPT  48   --    ALKPHOSPHAT  548*   --    TBILIRUBIN  2.1*   --      Recent Labs      17   234   SODIUM  134*  134*   POTASSIUM  5.0  4.5   CHLORIDE  104  106   CO2  20  20   GLUCOSE  113*  93   BUN  35*  32*   CREATININE  1.05  0.83   CALCIUM  8.1*  7.6*     Hemodynamics:  Temp (24hrs), Av.3 °C (97.4 °F), Min:36.1 °C (97 °F), Max:36.5 °C (97.7 °F)  Temperature: 36.1 °C (97 °F)  Pulse  Av.5  Min: 65  Max: 108   Blood Pressure : 117/82 mmHg     Respiratory:    Respiration: 18, Pulse  Oximetry: 94 %     Work Of Breathing / Effort: Mild  RUL Breath Sounds: Clear;Diminished, RML Breath Sounds: Clear;Diminished, RLL Breath Sounds: Clear;Diminished, JOHNY Breath Sounds: Clear;Diminished, LLL Breath Sounds: Clear;Diminished  Fluids:    Intake/Output Summary (Last 24 hours) at 03/05/17 1017  Last data filed at 03/05/17 0600   Gross per 24 hour   Intake      0 ml   Output    320 ml   Net   -320 ml     Weight: 88.8 kg (195 lb 12.3 oz)  GI/Nutrition:  Orders Placed This Encounter   Procedures   • DIET ORDER     Standing Status: Standing      Number of Occurrences: 1      Standing Expiration Date:      Order Specific Question:  Diet:     Answer:  Regular [1]     Medical Decision Making, by Problem:  Active Hospital Problems    Diagnosis   • *Liver mass [R16.0]   • Ascites [R18.8]   • Pulmonary emboli (CMS-HCC) [I26.99]   • Abdominal mass [R19.00]   • Metastatic cancer (CMS-HCC) [C80.1]   • HTN (hypertension) [I10]   • Melanoma (CMS-HCC) [C43.9]       Plan:  1.  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  No adjuvant therapy.  Worry is for relapsed and metastatic disease at this time.  Poor PS and poor functional status.    2.  Liver masses-- diffuse and extensive liver disease.  CT bx of liver mass done on 3/3/17, path pending.  Likely recurrent melanoma.  Not sure would be a candidate for any treatment given extensive debility and very advanced stage.  Not sure would tolerate side effects of even PD-1 inhibitors.  Hospice will likely be the best choice.  He is leaning in the direction of hospice currently.  Will see what results of liver biopsy show and then discuss options.    3.  Abdominal pain-- medicine starting PCA today.  Hopefully this will control the pain better.    4.  Acites-- moderate ascites on CT scan.  Medicine getting set-up for paracentesis today.  Lovenox held for this.  Once completed will need to be back on lovenox for PE's/DVT.    5.  PE, likely DVT-- was on  lovenox.  Needs to be on anticoagulation when not having procedures.    6.  Acid reflux-- on famotidine and carafte and GI cocktail.  Continue.    7.  Constipation--  On MOM, and senna and dulcolax.  Continue.      Highly complex case with high risk of morbidity and mortality.    Labs reviewed, Medications reviewed and Radiology images reviewed  Gregory catheter: No Gregory      DVT Prophylaxis: Enoxaparin (Lovenox)

## 2017-03-05 NOTE — PROGRESS NOTES
Assumed care of the patient. The patient is A&Ox4 and comfortable at this time. The patient's HR has been in the 100's, other VSS. The patient has been medicated for pain with good results. See MAR for details. PIV in place. Urinal at bedside. The patient has the call light within reach. All questions answered at this time. Hourly rounding is in place.

## 2017-03-05 NOTE — DISCHARGE PLANNING
Medical Social Work     Referral: [employer] search     Intervention: PAUL received page from RN regarding finding pts sons. PAUL ran [email or web address] search on each of the pts sons    Tahir MILLER was not able to find any phone numbers for these people.     Plan: [email or web address] search completed.

## 2017-03-05 NOTE — OR SURGEON
HISTORY/REASON FOR EXAM:  Ascites         TECHNIQUE/EXAM DESCRIPTION:  Ultrasound-guided paracentesis.    COMPARISON:  CT scan March 2, 2017    PROCEDURE:  Informed consent was obtained. A timeout was taken. Ascites was localized with real-time ultrasound. The right lower quadrant of the abdominal wall was prepared and draped in a sterile manner. Following local anesthesia with 1% lidocaine, a 5 Slovenian Yueh pigtail catheter was advanced into the peritoneal cavity with trocar technique. Ascites was drained. The patient tolerated the procedure well with no evidence of complication.    FINDINGS:  Ascites is present.    Fluid was not sent to the laboratory.    Fluid character: straw colored    IMPRESSION:  1. Ultrasound-guided therapeutic paracentesis of the right lower quadrant of the abdominal wall.    2. 2450 mL of fluid withdrawn.

## 2017-03-06 NOTE — CARE PLAN
Problem: Nutritional:  Goal: Achieve adequate nutritional intake  Patient will start PO diet and will consume >50% of meals   Outcome: PROGRESSING SLOWER THAN EXPECTED

## 2017-03-06 NOTE — PROGRESS NOTES
Patient seen and examined at 10am, poor saturations and altered, he was able to speak to me briefly but was struggling, he had definite decompensation from yesterday.  Lasix and scopolamine patch ordered stat and were given.  Patient continued to deteriorate and as no family available and patient starting to actively die, I called Dr Jay to the bedside for a urgent consult to allow me to make patient comfort care with 2 physician decision.  She and her fellow came to bedside, reviewed patient informatio and agreed that comfort care in this situation was the most appropriate choice.  Dilaudid PCA adjusted to reflect this and patient passed away soon after, as he was already Kussmal breathing prior to dose adjustment.  Code status order was not able to be made by me prior to patient's passing.  Bedside RN aware of code status change as she was with both Dr Jay and I when decision was made.  Patient's time of death was 1141, pronounced by me.

## 2017-03-06 NOTE — DISCHARGE PLANNING
"Medical Social Work    SW was informed by bedside RN that pt's  would like to talk to SW.  Pt passed away earlier today.  Pt provided permission for nursing to contact his , Amador Cool (ph#: [phone]), with updates on his status so that Amador can relay that information to his signaficant other.  Pt's signaficant other, Cecelia Gomez, had a stoke and has aphasia and is unable to communicate over the phone.    SW called and spoke with pt's , Amador.  Amador stated that she feels uncomfortable telling pt's s/o about pt's death and asked if there is anyone who can assist her.  She asked if [employer] can be tehre with her as she is worried that pt's s/o will have a \"melt down.\"  SW informed her that she will ask [employer] if they are able to assist.  Amador stated that pt's s/o is w/c bound and she is concerned that she is not able to care for herself.  SW encouraged Amador to file an EPS report.    SW called [employer] to see if they are able to assist in informing pt's s/o of pt's death.  [employer] dispatcher informed this SW that their officers do not do death notifications and the the corners office is the one to do that.  Bedside RN spoke with the corners office.  Pt is not a corners case and corners office is not able to notify pt's s/o of death as she is not pt's legal NOK.  SW called EPS and left a VM asking for a SW to call this SW back so that SW can see if one of their SW would be able to assist in notifying pt's s/o of pt's death.  SW also filed an EPS report for concerns of pt's s/o not being able to care for herself.    SW called , Amdaor, and left her a VM requesting a return call to provide update.      "

## 2017-03-06 NOTE — PROGRESS NOTES
Patients BP now 50s/30s- O2 in 80s on 15L, actively dying, 2 MDs (Dr Duarte and Dr Jay) at bedside for comfort care status change. Pt no longer responsive, keeping comfortable on dilaudid PCA, increased per MD order. O2 then removed. RN remains at bedside.

## 2017-03-06 NOTE — CARE PLAN
Problem: Safety  Goal: Will remain free from falls  Outcome: PROGRESSING AS EXPECTED  Bed frame alarm on.     Problem: Respiratory:  Goal: Respiratory status will improve  Intervention: Administer and titrate oxygen therapy  Patient now requiring 10L via mask, barely maintaining 90-92% and keeps removing O2 despite education.

## 2017-03-06 NOTE — CARE PLAN
Problem: Skin Integrity  Goal: Risk for impaired skin integrity will decrease  Intervention: Assess and monitor skin integrity, appearance and/or temperature  Thorough skin assessment has been performed. The patient has been encouraged to turn every 2 hours and has demonstrated understanding. Extra pillows are in place for cushioning.

## 2017-03-06 NOTE — PROGRESS NOTES
"No changes from epic. AOx4, fatigued, lethargic. BP 96/66 mmHg  Pulse 118  Temp(Src) 36.3 °C (97.4 °F)  Resp 18  Ht 1.93 m (6' 4\")  Wt 88.8 kg (195 lb 12.3 oz)  BMI 23.84 kg/m2  SpO2 91%  PCA in use for pain control. Unable to tolerate having O2 off without desaturating down to 60's, RN and CNA having to go in frequently to place mask back on pt,  in use. Increased WOB. Poor PO, minimal sips of milk/water. PIV infusing IVF, pca. Awaiting MD orders. Frequent rounding, Q15 to assure pt keeps O2 on. Call light and belongings within reach, able to make needs known, will monitor.  "

## 2017-03-06 NOTE — PROGRESS NOTES
Rapid RN in to assess pt, attempted NT suction with minimal response and output. Pt much more lethargic. Pulse Ox unable to properly determine O2 status. MD aware, declining rapidly. possible 2 MD comfort care order.

## 2017-03-06 NOTE — PROGRESS NOTES
Assumed care of the patient. The patient is A&Ox4, but stated that he was feeling very uncomfortable and nauseous. The patient is being medicated for pain via PCA pump with good results, but states that his nausea is increasing. The patient is on 4 liters O2 via mask. The patient is a one person assist. The patient has been updated on the plan of care. Call light is within reach, hourly rounding in place.

## 2017-03-07 NOTE — DISCHARGE SUMMARY
DISCHARGE DIAGNOSES:  Cardiopulmonary arrest secondary to respiratory failure   secondary to possible gastrointestinal bleed with confirmation of metastatic   melanoma infiltrating majority of his liver, bilateral pulmonary embolus,   metastatic cancer, hypertension, and ascites.    REASON FOR HOSPITALIZATION:  Patient is a 66-year-old male who presented to   the emergency room with worsening weakness, history of melanoma of the right   eye, had enucleation in 2013, had been seen by Dr. Hernandes.  Did not have   radiation and chemotherapy.  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  He did not wish to be ill while taking   care of her, and therefore he did not have any physician followup for   treatment.  Recently, he has been having abdominal distention, poor appetite,   nausea, and vomiting.  CT scan showed significant metastatic disease.    HOSPITAL COURSE:  Patient was admitted.  He had a huge liver mass, which was   biopsied, which did confirm malignant melanoma.  He had bilateral PEs and   after the biopsies were done, as well as paracentesis for therapeutic drainage   of ascites was completed, he was started on Lovenox for treatment of his   bilateral Pes.  In the morning of 03/06/2017, he started having very rapid   shallow respirations and difficulty maintaining saturations.  Patient had been   discussing wanting to go home with hospice as he realizes the very poor   prognosis he was undergoing.  However, patient continued to have deterioration   and _____ not available, decision was made by me, as well as palliative care   physician for two-physician decision to transition patient urgently to comfort   care as his status was dwindling quickly.  He was already on PCA pump for   pain management.  This was increased to help maximize his comfort and patient   passed away at 11:41 a.m.  He was pronounced by me.       ____________________________________     Stacey Duarte,  DO    NELLA / CARLA    DD:  03/06/2017 19:31:18  DT:  03/07/2017 02:39:02    D#:  [id number]  Job#:  [id number]

## 2017-03-09 NOTE — DISCHARGE PLANNING
Received request from  Jaya Gomez to contact pt's EPS worker, Kristie [phone]. EPS Worker requesting viewing of pt for s.o Cecelia Gomez. Pt  2017 and viewings are not conducted at [facility]. S.O will need to do viewing at [facility]. Contacted Nursing Operations. Pt is still here. Family has not made arrangements.     Attempted to contact Amador lawrence [phone]. Amador refused to provide any information regarding family other than they have been contacted. She would not provide phone numbers. She is angry that [facility] staff would not come to pt's home to inform s.o that he had . [facility] staff tried to get assistance from [employer] and 's office, both of whom declined to notify s.o. She stated the is writing a letter to Risk Management to express her dissatisfaction.     SW contacted 's office who does not have phone numbers for NOK listed on death information. Updated Nursing Operations.     Awaiting call back from EPS worker.